# Patient Record
Sex: FEMALE | Race: OTHER | HISPANIC OR LATINO | URBAN - METROPOLITAN AREA
[De-identification: names, ages, dates, MRNs, and addresses within clinical notes are randomized per-mention and may not be internally consistent; named-entity substitution may affect disease eponyms.]

---

## 2019-07-22 ENCOUNTER — EMERGENCY (EMERGENCY)
Facility: HOSPITAL | Age: 79
LOS: 1 days | Discharge: HOME | End: 2019-07-22
Attending: EMERGENCY MEDICINE
Payer: COMMERCIAL

## 2019-07-22 VITALS
OXYGEN SATURATION: 100 % | DIASTOLIC BLOOD PRESSURE: 63 MMHG | HEART RATE: 60 BPM | SYSTOLIC BLOOD PRESSURE: 112 MMHG | RESPIRATION RATE: 18 BRPM | TEMPERATURE: 98 F

## 2019-07-22 VITALS
WEIGHT: 167.99 LBS | TEMPERATURE: 98 F | OXYGEN SATURATION: 99 % | HEIGHT: 62 IN | SYSTOLIC BLOOD PRESSURE: 120 MMHG | HEART RATE: 92 BPM | RESPIRATION RATE: 20 BRPM | DIASTOLIC BLOOD PRESSURE: 71 MMHG

## 2019-07-22 PROCEDURE — 99283 EMERGENCY DEPT VISIT LOW MDM: CPT

## 2019-07-22 PROCEDURE — 72220 X-RAY EXAM SACRUM TAILBONE: CPT | Mod: 26

## 2019-07-22 PROCEDURE — 99283 EMERGENCY DEPT VISIT LOW MDM: CPT | Mod: 25

## 2019-07-22 PROCEDURE — 72220 X-RAY EXAM SACRUM TAILBONE: CPT

## 2019-07-22 NOTE — ED PROVIDER NOTE - CLINICAL SUMMARY MEDICAL DECISION MAKING FREE TEXT BOX
80 y/o F pt presents with mechanical fall with coccyx pain. Will obtain x-ray to evaluate for coccyx fracture and reassess.

## 2019-07-22 NOTE — ED PROVIDER NOTE - OBJECTIVE STATEMENT
78 y/o F pt with a PMHx of DM, HTN, HLD, presents to the ED with complaints of tailbone pain so fall 2 weeks ago. Patient reports she lost her balance and fell on her bottom. Patient is c/o or pain when sitting and has not taken any medication for the pain. Patient denies head trauma, syncope, LOC or any other symptoms. NKDA.

## 2019-07-22 NOTE — ED ADULT TRIAGE NOTE - CHIEF COMPLAINT QUOTE
Pain to coccyx area s/p trip and fall x 1 week ago. Has fracture right wrist had surgery x 2 weeks ago.

## 2021-03-20 ENCOUNTER — INPATIENT (INPATIENT)
Facility: HOSPITAL | Age: 81
LOS: 5 days | Discharge: HOME | DRG: 871 | End: 2021-03-26
Attending: HOSPITALIST | Admitting: HOSPITALIST
Payer: COMMERCIAL

## 2021-03-20 VITALS
OXYGEN SATURATION: 95 % | HEIGHT: 62 IN | RESPIRATION RATE: 18 BRPM | HEART RATE: 100 BPM | SYSTOLIC BLOOD PRESSURE: 154 MMHG | DIASTOLIC BLOOD PRESSURE: 85 MMHG | WEIGHT: 164.91 LBS | TEMPERATURE: 102 F

## 2021-03-20 DIAGNOSIS — A41.9 SEPSIS, UNSPECIFIED ORGANISM: ICD-10-CM

## 2021-03-20 LAB
ALBUMIN SERPL ELPH-MCNC: 3.3 G/DL — LOW (ref 3.5–5)
ALP SERPL-CCNC: 141 U/L — HIGH (ref 40–120)
ALT FLD-CCNC: 47 U/L DA — SIGNIFICANT CHANGE UP (ref 10–60)
ANION GAP SERPL CALC-SCNC: 10 MMOL/L — SIGNIFICANT CHANGE UP (ref 5–17)
APPEARANCE UR: ABNORMAL
APTT BLD: 30.3 SEC — SIGNIFICANT CHANGE UP (ref 27.5–35.5)
AST SERPL-CCNC: 91 U/L — HIGH (ref 10–40)
BASOPHILS # BLD AUTO: 0.01 K/UL — SIGNIFICANT CHANGE UP (ref 0–0.2)
BASOPHILS NFR BLD AUTO: 0.3 % — SIGNIFICANT CHANGE UP (ref 0–2)
BILIRUB SERPL-MCNC: 1 MG/DL — SIGNIFICANT CHANGE UP (ref 0.2–1.2)
BILIRUB UR-MCNC: NEGATIVE — SIGNIFICANT CHANGE UP
BUN SERPL-MCNC: 19 MG/DL — HIGH (ref 7–18)
CALCIUM SERPL-MCNC: 8.8 MG/DL — SIGNIFICANT CHANGE UP (ref 8.4–10.5)
CHLORIDE SERPL-SCNC: 102 MMOL/L — SIGNIFICANT CHANGE UP (ref 96–108)
CK SERPL-CCNC: 783 U/L — HIGH (ref 21–215)
CO2 SERPL-SCNC: 24 MMOL/L — SIGNIFICANT CHANGE UP (ref 22–31)
COLOR SPEC: YELLOW — SIGNIFICANT CHANGE UP
CREAT SERPL-MCNC: 1.1 MG/DL — SIGNIFICANT CHANGE UP (ref 0.5–1.3)
D DIMER BLD IA.RAPID-MCNC: 211 NG/ML DDU — SIGNIFICANT CHANGE UP
DIFF PNL FLD: ABNORMAL
EOSINOPHIL # BLD AUTO: 0 K/UL — SIGNIFICANT CHANGE UP (ref 0–0.5)
EOSINOPHIL NFR BLD AUTO: 0 % — SIGNIFICANT CHANGE UP (ref 0–6)
GLUCOSE SERPL-MCNC: 333 MG/DL — HIGH (ref 70–99)
GLUCOSE UR QL: 1000 MG/DL
HCT VFR BLD CALC: 36.4 % — SIGNIFICANT CHANGE UP (ref 34.5–45)
HGB BLD-MCNC: 12.2 G/DL — SIGNIFICANT CHANGE UP (ref 11.5–15.5)
IMM GRANULOCYTES NFR BLD AUTO: 0.6 % — SIGNIFICANT CHANGE UP (ref 0–1.5)
INR BLD: 1.28 RATIO — HIGH (ref 0.88–1.16)
KETONES UR-MCNC: ABNORMAL
LACTATE SERPL-SCNC: 2.4 MMOL/L — HIGH (ref 0.7–2)
LEUKOCYTE ESTERASE UR-ACNC: ABNORMAL
LYMPHOCYTES # BLD AUTO: 0.77 K/UL — LOW (ref 1–3.3)
LYMPHOCYTES # BLD AUTO: 21.6 % — SIGNIFICANT CHANGE UP (ref 13–44)
MCHC RBC-ENTMCNC: 29.5 PG — SIGNIFICANT CHANGE UP (ref 27–34)
MCHC RBC-ENTMCNC: 33.5 GM/DL — SIGNIFICANT CHANGE UP (ref 32–36)
MCV RBC AUTO: 87.9 FL — SIGNIFICANT CHANGE UP (ref 80–100)
MONOCYTES # BLD AUTO: 0.43 K/UL — SIGNIFICANT CHANGE UP (ref 0–0.9)
MONOCYTES NFR BLD AUTO: 12.1 % — SIGNIFICANT CHANGE UP (ref 2–14)
NEUTROPHILS # BLD AUTO: 2.33 K/UL — SIGNIFICANT CHANGE UP (ref 1.8–7.4)
NEUTROPHILS NFR BLD AUTO: 65.4 % — SIGNIFICANT CHANGE UP (ref 43–77)
NITRITE UR-MCNC: NEGATIVE — SIGNIFICANT CHANGE UP
NRBC # BLD: 0 /100 WBCS — SIGNIFICANT CHANGE UP (ref 0–0)
PH UR: 5 — SIGNIFICANT CHANGE UP (ref 5–8)
PLATELET # BLD AUTO: 155 K/UL — SIGNIFICANT CHANGE UP (ref 150–400)
POTASSIUM SERPL-MCNC: 3.9 MMOL/L — SIGNIFICANT CHANGE UP (ref 3.5–5.3)
POTASSIUM SERPL-SCNC: 3.9 MMOL/L — SIGNIFICANT CHANGE UP (ref 3.5–5.3)
PROT SERPL-MCNC: 7.3 G/DL — SIGNIFICANT CHANGE UP (ref 6–8.3)
PROT UR-MCNC: 100
PROTHROM AB SERPL-ACNC: 15 SEC — HIGH (ref 10.6–13.6)
RAPID RVP RESULT: DETECTED
RBC # BLD: 4.14 M/UL — SIGNIFICANT CHANGE UP (ref 3.8–5.2)
RBC # FLD: 12.3 % — SIGNIFICANT CHANGE UP (ref 10.3–14.5)
SARS-COV-2 RNA SPEC QL NAA+PROBE: DETECTED
SODIUM SERPL-SCNC: 136 MMOL/L — SIGNIFICANT CHANGE UP (ref 135–145)
SP GR SPEC: 1.02 — SIGNIFICANT CHANGE UP (ref 1.01–1.02)
TROPONIN I SERPL-MCNC: 0.06 NG/ML — HIGH (ref 0–0.04)
UROBILINOGEN FLD QL: 1
WBC # BLD: 3.56 K/UL — LOW (ref 3.8–10.5)
WBC # FLD AUTO: 3.56 K/UL — LOW (ref 3.8–10.5)

## 2021-03-20 PROCEDURE — 72131 CT LUMBAR SPINE W/O DYE: CPT | Mod: 26,MA

## 2021-03-20 PROCEDURE — 93010 ELECTROCARDIOGRAM REPORT: CPT

## 2021-03-20 PROCEDURE — 72125 CT NECK SPINE W/O DYE: CPT | Mod: 26,MA

## 2021-03-20 PROCEDURE — 99222 1ST HOSP IP/OBS MODERATE 55: CPT

## 2021-03-20 PROCEDURE — 70450 CT HEAD/BRAIN W/O DYE: CPT | Mod: 26,MA

## 2021-03-20 PROCEDURE — 99285 EMERGENCY DEPT VISIT HI MDM: CPT

## 2021-03-20 PROCEDURE — 73610 X-RAY EXAM OF ANKLE: CPT | Mod: 26,LT

## 2021-03-20 PROCEDURE — 72192 CT PELVIS W/O DYE: CPT | Mod: 26,MA

## 2021-03-20 PROCEDURE — 71045 X-RAY EXAM CHEST 1 VIEW: CPT | Mod: 26

## 2021-03-20 PROCEDURE — 73562 X-RAY EXAM OF KNEE 3: CPT | Mod: 26,LT

## 2021-03-20 RX ORDER — ENOXAPARIN SODIUM 100 MG/ML
40 INJECTION SUBCUTANEOUS DAILY
Refills: 0 | Status: DISCONTINUED | OUTPATIENT
Start: 2021-03-20 | End: 2021-03-23

## 2021-03-20 RX ORDER — ACETAMINOPHEN 500 MG
650 TABLET ORAL ONCE
Refills: 0 | Status: COMPLETED | OUTPATIENT
Start: 2021-03-20 | End: 2021-03-20

## 2021-03-20 RX ORDER — PIPERACILLIN AND TAZOBACTAM 4; .5 G/20ML; G/20ML
3.38 INJECTION, POWDER, LYOPHILIZED, FOR SOLUTION INTRAVENOUS ONCE
Refills: 0 | Status: COMPLETED | OUTPATIENT
Start: 2021-03-20 | End: 2021-03-20

## 2021-03-20 RX ORDER — SODIUM CHLORIDE 9 MG/ML
1550 INJECTION INTRAMUSCULAR; INTRAVENOUS; SUBCUTANEOUS ONCE
Refills: 0 | Status: COMPLETED | OUTPATIENT
Start: 2021-03-20 | End: 2021-03-20

## 2021-03-20 RX ORDER — ASPIRIN/CALCIUM CARB/MAGNESIUM 324 MG
162 TABLET ORAL ONCE
Refills: 0 | Status: COMPLETED | OUTPATIENT
Start: 2021-03-20 | End: 2021-03-20

## 2021-03-20 RX ADMIN — Medication 650 MILLIGRAM(S): at 21:18

## 2021-03-20 RX ADMIN — Medication 162 MILLIGRAM(S): at 22:12

## 2021-03-20 RX ADMIN — SODIUM CHLORIDE 1550 MILLILITER(S): 9 INJECTION INTRAMUSCULAR; INTRAVENOUS; SUBCUTANEOUS at 21:18

## 2021-03-20 RX ADMIN — Medication 650 MILLIGRAM(S): at 21:57

## 2021-03-20 RX ADMIN — PIPERACILLIN AND TAZOBACTAM 200 GRAM(S): 4; .5 INJECTION, POWDER, LYOPHILIZED, FOR SOLUTION INTRAVENOUS at 21:18

## 2021-03-20 RX ADMIN — SODIUM CHLORIDE 1550 MILLILITER(S): 9 INJECTION INTRAMUSCULAR; INTRAVENOUS; SUBCUTANEOUS at 22:40

## 2021-03-20 NOTE — H&P ADULT - PROBLEM SELECTOR PLAN 7
fu lipid panel and continue with home medications Pt at home on lisinopril 40 qd and metoprolol succ 50 qd   will cw metoprolol tart 25 bid for now

## 2021-03-20 NOTE — H&P ADULT - NSHPLABSRESULTS_GEN_ALL_CORE
< from: CT Lumbar Spine No Cont (03.20.21 @ 21:16) >      EXAM:  CT LUMBAR SPINE                          EXAM:  CT PELVIS BONY ONLY                            PROCEDURE DATE:  03/20/2021          INTERPRETATION:  CLINICAL INFORMATION: Status post fall with left hip and lower back pain.    TECHNIQUE: Axial noncontrast CT imaging of the bony pelvis and lumbar spine was performed. Coronal cell reformats were obtained.    COMPARISON: 11/6/2012 abdominal CT    FINDINGS:    Pelvis: No acute displaced fracture or dislocation is identified. There is questionable chronic fracture deformity of the sacrum (8, 142). The pubic symphysis and sacroiliac joints are maintained. There is mild bilateral hip osteoarthrosis.    Lumbar spine: There are 5 nonrib-bearing lumbar-type vertebral bodies. Lumbar lordosis is maintained. There is no spondylolisthesis. Vertebral body heights are maintained. There is mild degenerative disc disease at L5-S1. No high-grade stenosis is identified.    Intra-abdominal findings: Colonic diverticulosis. Atherosclerotic vascular calcifications. Cholecystectomy. 1.4 cm right adnexal cyst.    IMPRESSION:      1. No acute displaced fracture. MRI can be obtained for further evaluation if there is a high level clinical concern.  2. Incidentally noted 1.4 cm right adnexal cyst. Follow-up nonemergent pelvic ultrasound is recommended.            HERNAN ARIZA MD; Attending Radiologist  This document has been electronically signed. Mar 20 2021  9:37PM    < end of copied text >    < from: CT Cervical Spine No Cont (03.20.21 @ 21:08) >    CERVICAL SPINE:  There is no acute displaced fracture or traumatic malalignment.  There is no prevertebral soft tissue swelling.    There is maintenance of the cervical lordosis.  Vertebral heights and intervertebral disc spaces are grossly maintained. C3 vertebral hemangioma.    IMPRESSION:    BRAIN: No intracranial hemorrhage, mass effect or depressed skull fracture.  CERVICAL SPINE: No displaced fracture or traumatic malalignment.      < end of copied text > < from: CT Lumbar Spine No Cont (03.20.21 @ 21:16) >    Pelvis: No acute displaced fracture or dislocation is identified. There is questionable chronic fracture deformity of the sacrum (8, 142). The pubic symphysis and sacroiliac joints are maintained. There is mild bilateral hip osteoarthrosis.    Lumbar spine: There are 5 nonrib-bearing lumbar-type vertebral bodies. Lumbar lordosis is maintained. There is no spondylolisthesis. Vertebral body heights are maintained. There is mild degenerative disc disease at L5-S1. No high-grade stenosis is identified.    Intra-abdominal findings: Colonic diverticulosis. Atherosclerotic vascular calcifications. Cholecystectomy. 1.4 cm right adnexal cyst.    IMPRESSION:      1. No acute displaced fracture. MRI can be obtained for further evaluation if there is a high level clinical concern.  2. Incidentally noted 1.4 cm right adnexal cyst. Follow-up nonemergent pelvic ultrasound is recommended.          < end of copied text >        < from: CT Cervical Spine No Cont (03.20.21 @ 21:08) >    CERVICAL SPINE:  BRAIN: No intracranial hemorrhage, mass effect or depressed skull fracture.  CERVICAL SPINE: No displaced fracture or traumatic malalignment.    IMPRESSION:    BRAIN: No intracranial hemorrhage, mass effect or depressed skull fracture.  CERVICAL SPINE: No displaced fracture or traumatic malalignment.      < end of copied text >

## 2021-03-20 NOTE — H&P ADULT - PROBLEM SELECTOR PLAN 5
pt is on room air not requiring any supplemental o2   will continue to monitor pt presents with multiple falls   CT BRAIN: No intracranial hemorrhage, mass effect or depressed skull fracture.  CERVICAL SPINE: No displaced fracture or traumatic malalignment.  Lumbar spine: No acute displaced fracture.  will consult Physical therapy

## 2021-03-20 NOTE — H&P ADULT - PROBLEM SELECTOR PLAN 8
pt presents with multipe falls   CT BRAIN: No intracranial hemorrhage, mass effect or depressed skull fracture.  CERVICAL SPINE: No displaced fracture or traumatic malalignment.  Lumbar spine: No acute displaced fracture.  will consult Physical therapy fu lipid panel and continue with home medications

## 2021-03-20 NOTE — ED PROVIDER NOTE - CADM POA PRESS ULCER
Emergency Department Resident Note    Patient: Sakina Navarro Age: 36 year old Sex: female   MRN: [id number] : 1985 Encounter Date: 2021       HISTORY OF PRESENT ILLNESS  Sakina Navarro is a 36 year old female with past medical history of hypothyroidism who presents to the emergency department for evaluation of persistent abdominal pain, nausea, vomiting, diarrhea x4 days.  Patient reports she ate homemade steak tacos 4 days ago.  Initially felt fine immediately after eating.  Was able to complete her nighttime chores.  Woke up at 11:30 PM that evening with abdominal pain and vomiting diarrhea.  Patient reports several episodes of nonbilious nonbloody emesis and nonbloody diarrhea.  States everything she is trying to eat has immediately come back up.  Unable to tolerate p.o. intake.  States both her children had similar symptoms and were admitted to the pediatric hospital discharge today.  Diagnosis of gastroenteritis.  They were both negative for Covid at time of admission.  Associated symptoms include chills, denies any fevers but does not have a thermometer at home.  No history of similar symptoms.  Denies fever, chills, cough, congestion, chest pain, shortness of breath, dysuria, headache, or weakness/numbness. No other complaints.     EM Caveat: None  PPE: gloves, mask, faceshield  Social Hx: Denies tobacco use, daily alcohol, daily marijuana use  Surgical Hx: No surgical      REVIEW OF SYSTEMS  Review of Systems   Constitutional: Negative for fever.   HENT: Negative for sore throat.    Eyes: Negative for redness.   Respiratory: Negative for cough and shortness of breath.    Cardiovascular: Negative for chest pain and leg swelling.   Gastrointestinal: Positive for abdominal pain, diarrhea, nausea and vomiting.   Genitourinary: Negative for dysuria and hematuria.   Musculoskeletal: Negative for back pain.   Skin: Negative for rash.   Neurological: Negative for headaches.              PAST  HISTORY         Past Medical History:   Diagnosis Date   • Anxiety    • Depression    • Thyroid condition     No past surgical history on file.          Social History     Tobacco Use   • Smoking status: Not on file   Substance Use Topics   • Alcohol use: Not on file   • Drug use: Not on file    No family history on file.          Prior to Admission medications    Medication Sig Start Date End Date Taking? Authorizing Provider   ondansetron (Zofran ODT) 4 MG disintegrating tablet Place 1 tablet onto the tongue every 6 hours. 3/5/21   Fatoumata BRITTANY Diaz    Not on File     PHYSICAL EXAMINATION  ED Triage Vitals   ED Triage Vitals Group      Temp 03/05/21 1907 98.6 °F (37 °C)      Heart Rate 03/05/21 1907 76      Resp 03/05/21 1907 16      BP 03/05/21 1907 135/86      SpO2 03/05/21 1907 97 %      EtCO2 mmHg --       Height --       Weight 03/05/21 1904 216 lb 7.9 oz (98.2 kg)      Weight Scale Used 03/05/21 1907 Standing scale      BMI (Calculated) --       IBW/kg (Calculated) --      Physical Exam  Vitals signs reviewed.   HENT:      Head: Normocephalic and atraumatic.      Mouth/Throat:      Mouth: Mucous membranes are moist.   Eyes:      Extraocular Movements: Extraocular movements intact.      Pupils: Pupils are equal, round, and reactive to light.   Cardiovascular:      Rate and Rhythm: Normal rate and regular rhythm.      Pulses: Normal pulses.      Heart sounds: Normal heart sounds.   Pulmonary:      Effort: Pulmonary effort is normal.      Breath sounds: Normal breath sounds.   Abdominal:      General: There is no distension.      Palpations: Abdomen is soft.      Tenderness: There is abdominal tenderness in the epigastric area. There is no guarding or rebound.   Skin:     General: Skin is warm and dry.      Capillary Refill: Capillary refill takes less than 2 seconds.   Neurological:      General: No focal deficit present.      Mental Status: She is alert and oriented to person, place, and time.    Psychiatric:         Mood and Affect: Mood normal.         Behavior: Behavior normal.         MEDICAL DECISION MAKING  36-year-old female with past medical history of hypothyroidism, who presents the emerge department for evaluation of persistent abdominal pain, nausea, vomiting, diarrhea x4 days.  On initial arrival patient is afebrile stable vital signs.  Physical exam significant for mild epigastric tenderness, without rebound, without guarding, no peritoneal signs.  Plan for symptoms consistent with viral gastroenteritis.  Especially given the fact that patient's children had similar symptoms.  Plan for basic labs including CBC, CMP, lipase, urinalysis.  Will treat due to medical fluids and Zofran.  We will also test for Covid.  Will observe patient in the emergency department ensure she is able to tolerate p.o. prior to discharge.      Labs:   Results for orders placed or performed during the hospital encounter of 03/05/21   Comprehensive Metabolic Panel   Result Value Ref Range    Fasting Status      Sodium 138 135 - 145 mmol/L    Potassium 3.5 3.4 - 5.1 mmol/L    Chloride 108 (H) 98 - 107 mmol/L    Carbon Dioxide 27 21 - 32 mmol/L    Anion Gap 7 (L) 10 - 20 mmol/L    Glucose 91 65 - 99 mg/dL    BUN 19 6 - 20 mg/dL    Creatinine 0.71 0.51 - 0.95 mg/dL    Glomerular Filtration Rate >90 >90 mL/min/1.73m2    BUN/ Creatinine Ratio 27 (H) 7 - 25    Calcium 8.6 8.4 - 10.2 mg/dL    Bilirubin, Total 0.6 0.2 - 1.0 mg/dL    GOT/AST 27 <=37 Units/L    GPT/ALT 47 <64 Units/L    Alkaline Phosphatase 63 45 - 117 Units/L    Albumin 4.1 3.6 - 5.1 g/dL    Protein, Total 8.6 (H) 6.4 - 8.2 g/dL    Globulin 4.5 (H) 2.0 - 4.0 g/dL    A/G Ratio 0.9 (L) 1.0 - 2.4   Lipase   Result Value Ref Range    Lipase 391 73 - 393 Units/L   CBC with Automated Differential (performable only)   Result Value Ref Range    WBC 5.5 4.2 - 11.0 K/mcL    RBC 3.78 (L) 4.00 - 5.20 mil/mcL    HGB 12.3 12.0 - 15.5 g/dL    HCT 35.6 (L) 36.0 - 46.5 %    MCV  94.2 78.0 - 100.0 fl    MCH 32.5 26.0 - 34.0 pg    MCHC 34.6 32.0 - 36.5 g/dL    RDW-CV 13.0 11.0 - 15.0 %    RDW-SD 44.4 39.0 - 50.0 fL     140 - 450 K/mcL    NRBC 0 <=0 /100 WBC    Neutrophil, Percent 53 %    Lymphocytes, Percent 36 %    Mono, Percent 10 %    Eosinophils, Percent 1 %    Basophils, Percent 0 %    Immature Granulocytes 0 %    Absolute Neutrophils 2.8 1.8 - 7.7 K/mcL    Absolute Lymphocytes 2.0 1.0 - 4.8 K/mcL    Absolute Monocytes 0.6 0.3 - 0.9 K/mcL    Absolute Eosinophils  0.0 0.0 - 0.5 K/mcL    Absolute Basophils 0.0 0.0 - 0.3 K/mcL    Absolute Immmature Granulocytes 0.0 0.0 - 0.2 K/mcL   HCG POC   Result Value Ref Range    HCG, URINE - POINT OF CARE Negative Negative       ED Course as of Mar 05 2346   Fri Mar 05, 2021   2346 Patient able to tolerate p.o. intake here in the emergency department.  Will discharge home with prescription for Zofran.  Also will send outpatient Covid test given symptoms.  Return precaution discussed with patient stable nontoxic-appearing time of discharge.    [OO]      ED Course User Index  [OO] Fatoumata Higginbotham     ED Medication Orders (From admission, onward)    Ordered Start     Status Ordering Provider    03/05/21 2109 03/05/21 2110  lactated ringers bolus 1,000 mL  ONCE      Last MAR action: Completed FATOUMATA HIGGINBOTHAM    03/05/21 2109 03/05/21 2110  ondansetron (ZOFRAN) injection 4 mg  ONCE      Last MAR action: Given FATOUMATA HIGGINBOTHAM            IMPRESSION AND PLAN  ED Diagnosis   1. Gastroenteritis         Discharge 3/5/2021 11:00 PM  Sakina Navarro discharge to home/self care.           X Patient discharged with prescriptions for the following. Discussed medication in detail including dosing, side effects, and interactions. :     Summary of your Discharge Medications      Take these Medications      Details   ondansetron 4 MG disintegrating tablet  Commonly known as: Zofran ODT   Place 1 tablet onto the tongue every 6 hours.            This patient was seen during the novel Coronavirus, COVID-19 pandemic. There were significant changes in work flow, staffing, resource availability, disposition and practice patterns due to this pandemic (as recommended by the CDC and [employer]). This note may have been created using voice dictation technology and may include inadvertent errors.     Fatoumata Diaz  Emergency Medicine, PGY-3         Fatoumata Diaz  Resident  03/05/21 9809     No

## 2021-03-20 NOTE — H&P ADULT - PROBLEM SELECTOR PROBLEM 1
Sepsis, due to unspecified organism, unspecified whether acute organ dysfunction present Sepsis Urinary tract infection with hematuria, site unspecified

## 2021-03-20 NOTE — H&P ADULT - PROBLEM SELECTOR PLAN 9
IMPROVE VTE Individual Risk Assessment  RISK                                                         Points  [  ] Previous VTE                                      3  [  ] Thrombophilia                                   2  [  ] Lower limb paralysis                         2 (unable to hold up >15 seconds)    [  ] Current Cancer                                  2       (within 6 months)  [  ] Immobilization > 24 hrs                    1  [  ] ICU/CCU stay > 24 hrs                         1  [  ] Age > 60                                              1   lovenox qd for dvt ppx

## 2021-03-20 NOTE — H&P ADULT - PROBLEM SELECTOR PLAN 6
Pt at home on lisinopril 40 qd and metoprolol succ 50 qd   will cw metoprolol tart 25 bid for now uncontrolled DM   pt at home on metformin 500 qd   fu a1c   urine glu >100, bg >300   started on lantus 10 u and hss

## 2021-03-20 NOTE — H&P ADULT - ATTENDING COMMENTS
Vital Signs Last 24 Hrs  T(C): 37.7 (20 Mar 2021 23:33), Max: 38.9 (20 Mar 2021 19:45)  T(F): 99.9 (20 Mar 2021 23:33), Max: 102 (20 Mar 2021 19:45)  HR: 81 (20 Mar 2021 23:33) (81 - 100)  BP: 123/71 (20 Mar 2021 23:33) (123/71 - 154/85)  BP(mean): --  RR: 17 (20 Mar 2021 23:33) (17 - 18)  SpO2: 97% (20 Mar 2021 23:33) (95% - 97%) Pt seen and examined  Case discussed with MAR.    80 year old woman with PMH of HTN, HLD, DM2,  BIBEMS after a few days of flu-like illness, productive cough and generalized weakness. On the day of admission, she states her leg gave way and she sustained a fall. No LOC, no head trauma.    Vital Signs Last 24 Hrs  T(C): 37.7 (20 Mar 2021 23:33), Max: 38.9 (20 Mar 2021 19:45)  T(F): 99.9 (20 Mar 2021 23:33), Max: 102 (20 Mar 2021 19:45)  HR: 81 (20 Mar 2021 23:33) (81 - 100)  BP: 123/71 (20 Mar 2021 23:33) (123/71 - 154/85)  RR: 17 (20 Mar 2021 23:33) (17 - 18)  SpO2: 97% (20 Mar 2021 23:33) (95% - 97%)    Elderly woman, states she feels better, NAD AAO X 3  CTA B/L RRR S1S2]  SaO2 - 96% on RA  Soft NT ND BS +  No pedal edema    Labs                        12.2   3.56  )-----------( 155      ( 20 Mar 2021 20:53 )             36.4     03-20    136  |  102  |  19<H>  ----------------------------<  333<H>  3.9   |  24  |  1.10    Ca    8.8      20 Mar 2021 20:53    TPro  7.3  /  Alb  3.3<L>  /  TBili  1.0  /  DBili  x   /  AST  91<H>  /  ALT  47  /  AlkPhos  141<H>  03-20    lact -2.4    CARDIAC MARKERS ( 21 Mar 2021 01:04 )  0.078 ng/mL / x     / x     / x     / x      CARDIAC MARKERS ( 20 Mar 2021 20:53 )  0.061 ng/mL / x     / 783 U/L / x     / x        Urinalysis Basic - ( 20 Mar 2021 21:53 )    Color: Yellow / Appearance: Slightly Turbid / S.020 / pH: x  Gluc: x / Ketone: Large  / Bili: Negative / Urobili: 1   Blood: x / Protein: 100 / Nitrite: Negative   Leuk Esterase: Moderate / RBC: 10-25 /HPF / WBC 26-50 /HPF   Sq Epi: x / Non Sq Epi: Many /HPF / Bacteria: Many /HPF    SARS-CoV-2: Detected (20 Mar 2021 21:25)    CT L-spine - no acute fracture  CT -c-spine - no acute fracture  head CT - no acute issues  CT pelvis - 1.4cm adnexal mass    Impression  - sepsis  - Acute UTI   - Acute covid 19 neumonia Pt seen and examined  Case discussed with MAR.    80 year old woman with PMH of HTN, HLD, DM2,  BIBEMS after a few days of flu-like illness, productive cough and generalized weakness. On the day of admission, she states her leg gave way and she sustained a fall. No LOC, no head trauma.    Vital Signs Last 24 Hrs  T(C): 37.7 (20 Mar 2021 23:33), Max: 38.9 (20 Mar 2021 19:45)  T(F): 99.9 (20 Mar 2021 23:33), Max: 102 (20 Mar 2021 19:45)  HR: 81 (20 Mar 2021 23:33) (81 - 100)  BP: 123/71 (20 Mar 2021 23:33) (123/71 - 154/85)  RR: 17 (20 Mar 2021 23:33) (17 - 18)  SpO2: 97% (20 Mar 2021 23:33) (95% - 97%)    Elderly woman, states she feels better, NAD AAO X 3  CTA B/L RRR S1S2]  SaO2 - 96% on RA  Soft NT ND BS +  No pedal edema  No focal deficits    Labs                        12.2   3.56  )-----------( 155      ( 20 Mar 2021 20:53 )             36.4     03-20    136  |  102  |  19<H>  ----------------------------<  333<H>  3.9   |  24  |  1.10    Ca    8.8      20 Mar 2021 20:53    TPro  7.3  /  Alb  3.3<L>  /  TBili  1.0  /  DBili  x   /  AST  91<H>  /  ALT  47  /  AlkPhos  141<H>  03-20    lact -2.4    CARDIAC MARKERS ( 21 Mar 2021 01:04 )  0.078 ng/mL / x     / x     / x     / x      CARDIAC MARKERS ( 20 Mar 2021 20:53 )  0.061 ng/mL / x     / 783 U/L / x     / x        Urinalysis Basic - ( 20 Mar 2021 21:53 )    Color: Yellow / Appearance: Slightly Turbid / S.020 / pH: x  Gluc: x / Ketone: Large  / Bili: Negative / Urobili: 1   Blood: x / Protein: 100 / Nitrite: Negative   Leuk Esterase: Moderate / RBC: 10-25 /HPF / WBC 26-50 /HPF   Sq Epi: x / Non Sq Epi: Many /HPF / Bacteria: Many /HPF    SARS-CoV-2: Detected (20 Mar 2021 21:25)    CT L-spine - no acute fracture  CT -c-spine - no acute fracture  head CT - no acute issues  CT pelvis - 1.4cm adnexal mass    Impression  - Acute UTI   - Acute Covid 19 pneumonia  - Sepsis with lactic acidosis  - Elevated troponin; NSTEMI vs demand ischemia  - Fall   - Incidental adnexal mass    Plan   - Admit to telemetry with airborne precaution  - Sepsis work up with blood and urine culture  - Initiate empiric antibiotics with IV ceftriaxone for UTI   - No indication for IV dexamethasone or remdesivir   - Serial troponin; EKG   - ECHO  - If troponin uptrending significantly, NSTEMI protocol  - PT evaluation; no evidence of fracture  - IVF hydration  - Non-urgent pelvic U/S

## 2021-03-20 NOTE — ED PROVIDER NOTE - MUSCULOSKELETAL, MLM
Range of motion is not limited, no muscle or joint tenderness; Mild tenderness to posterior C-Spine and midline lumbar spine; Extremities: full ROM, no swelling, neurovascularly intact.

## 2021-03-20 NOTE — H&P ADULT - PROBLEM SELECTOR PLAN 3
Pt noted to have elevated troponin .061  likely T2 in the setting of sepsis   denies chest pain   no st elevation on ekg   will send T2   will monitor on tele Pt presented with falls, weakness and malaise   ED vitals fever 102 , wbc 3.5 low   UA positive, + suprapubic tenderness  s/p rocephin in E D  lactate 2.4 s/p ivf bolus in ED   COVID positive   fu urine cultures   will cont with rocephin until cx are back with sensitivity Pt presented with falls, weakness and malaise   ED vitals fever 102 , wbc 3.5 low   UA positive, + suprapubic tenderness  s/p rocephin in E D  lactate 2.4 s/p ivf bolus in ED   plan as above

## 2021-03-20 NOTE — ED ADULT NURSE NOTE - NSIMPLEMENTINTERV_GEN_ALL_ED
Implemented All Fall Risk Interventions:  Wake to call system. Call bell, personal items and telephone within reach. Instruct patient to call for assistance. Room bathroom lighting operational. Non-slip footwear when patient is off stretcher. Physically safe environment: no spills, clutter or unnecessary equipment. Stretcher in lowest position, wheels locked, appropriate side rails in place. Provide visual cue, wrist band, yellow gown, etc. Monitor gait and stability. Monitor for mental status changes and reorient to person, place, and time. Review medications for side effects contributing to fall risk. Reinforce activity limits and safety measures with patient and family.

## 2021-03-20 NOTE — H&P ADULT - NSHPPHYSICALEXAM_GEN_ALL_CORE
Vital Signs Last 24 Hrs  T(C): 37.7 (20 Mar 2021 23:33), Max: 38.9 (20 Mar 2021 19:45)  T(F): 99.9 (20 Mar 2021 23:33), Max: 102 (20 Mar 2021 19:45)  HR: 81 (20 Mar 2021 23:33) (78 - 100)  BP: 123/71 (20 Mar 2021 23:33) (123/71 - 154/85)  BP(mean): --  RR: 17 (20 Mar 2021 23:33) (17 - 18)  SpO2: 97% (20 Mar 2021 23:33) (95% - 97%)    GENERAL: NAD, lying in bed comfortably  HEAD:  Atraumatic, Normocephalic  EYES: EOMI, PERRLA, conjunctiva and sclera clear  ENT: Moist mucous membranes  NECK: Supple, No JVD  CHEST/LUNG: Clear to auscultation bilaterally; No rales, rhonchi, wheezing, or rubs.  HEART: Regular rate and rhythm; S1+ S2+  ABDOMEN: Bowel sounds present; Soft, Nontender, Nondistended. No hepatomegaly  EXTREMITIES:  2+ Peripheral Pulses, brisk capillary refill. No clubbing, cyanosis, or edema  NERVOUS SYSTEM:  Alert & Oriented , speech clear. No deficits   MSK: FROM all 4 extremities, full and equal strength  SKIN: No rashes or lesions Vital Signs Last 24 Hrs  T(C): 37.7 (20 Mar 2021 23:33), Max: 38.9 (20 Mar 2021 19:45)  T(F): 99.9 (20 Mar 2021 23:33), Max: 102 (20 Mar 2021 19:45)  HR: 81 (20 Mar 2021 23:33) (78 - 100)  BP: 123/71 (20 Mar 2021 23:33) (123/71 - 154/85)  BP(mean): --  RR: 17 (20 Mar 2021 23:33) (17 - 18)  SpO2: 97% (20 Mar 2021 23:33) (95% - 97%)    GENERAL: NAD, lying in bed comfortably  HEAD:  Atraumatic, Normocephalic  EYES: EOMI, PERRLA, conjunctiva and sclera clear  ENT: Moist mucous membranes  NECK: Supple, No JVD  CHEST/LUNG: Clear to auscultation bilaterally; No rales, rhonchi, wheezing, or rubs.  HEART: Regular rate and rhythm; S1+ S2+  ABDOMEN: Bowel sounds present; Soft,  + suprapubic tenderness , Nondistended.   EXTREMITIES:  2+ Peripheral Pulses, brisk capillary refill. No clubbing, cyanosis, or edema  NERVOUS SYSTEM:  Alert & Oriented , speech clear. No deficits   MSK: FROM all 4 extremities, full and equal strength  SKIN: No rashes or lesions

## 2021-03-20 NOTE — H&P ADULT - PROBLEM SELECTOR PLAN 1
Pt presented with falls, weakness and malaise   ED vitals fever 102 , wbc 3.5 low   UA positive, + suprapubic tenderness  s/p rocephin in E D  lactate 2.4 s/p ivf bolus in ED   COVID positive   fu urine cultures   will cont with rocephin until cx are back with sensitivity plan as above

## 2021-03-20 NOTE — ED PROVIDER NOTE - OBJECTIVE STATEMENT
81 y/o female with history of HTN, high cholesterol, and DM, was brought in by ambulance from home with generalized weakness and malaise for 3 days as per family. Patient reports she fell (mechanical fall) at 8:00 AM this morning but could get up from floor until this evening. Patient notes mild pain to lower back and left leg, poorly localized. Patient noted that when she tried to get up from floor she had minor head injury and mild chest discomfort today. She admits subjective fever and cough. Patient has had one dose of COVID vaccine so far and is not a smoker. Patient denies loss of consciousness, shortness of breath, vomiting, diarrhea, dysuria, focal weakness, or any other complaints. NKDA.

## 2021-03-20 NOTE — H&P ADULT - HISTORY OF PRESENT ILLNESS
81 y/o female from home with history of HTN, hld, and DM presented to ED after a fall. As per daughter pt has been feeling flu like symptoms for 5 days with malaise, generalized weakness. Patient states that her left knee gave out this morning at 8:00 AM and could get up from floor until 3pm. Pt states she has been having multiple falls in the last few days. Pt does not use cane to ambulate. Patient states she had fever today associated with suprapubic tenderness. Pt denies chest pain, sob, n/v/d.  off note As per daughter pt had her first covid vaccine 3/9.

## 2021-03-20 NOTE — H&P ADULT - PROBLEM SELECTOR PLAN 4
uncontrolled DM   pt at home on metformin 500 qd   fu a1c   urine glu >100, bg >300   started on lantus 10 u and hss Pt noted to have elevated troponin .061  likely T2 in the setting of sepsis   denies chest pain   no st elevation on ekg   will send T2   will monitor on tele

## 2021-03-20 NOTE — ED PROVIDER NOTE - CARE PLAN
Principal Discharge DX:	Sepsis, due to unspecified organism, unspecified whether acute organ dysfunction present  Secondary Diagnosis:	Urinary tract infection with hematuria, site unspecified

## 2021-03-20 NOTE — ED ADULT TRIAGE NOTE - CHIEF COMPLAINT QUOTE
BIBA s/p fall, denies LOC, denies head or neck involvement, family states pt has been c/o weakness and gen malaise x 3 days, pt has fever and is hyperglycemic

## 2021-03-20 NOTE — ED PROVIDER NOTE - CLINICAL SUMMARY MEDICAL DECISION MAKING FREE TEXT BOX
79 y/o woman, h/o HTN, p/w fall at 8 am, lying on floor all day, and also c/o cough, subjective fever, vague chest discomfort, with mild head injury, pain to low back and left LE poorly localized, nonfocal exam with normal mental status--CT/X-ray imaging for r/o injuries, septic workup, RVP, EKG, admit.

## 2021-03-20 NOTE — H&P ADULT - ASSESSMENT
81 y/o female from home with history of HTN, hld, and DM presented to ED after a fall.  Pt noted to have UTI and admitted for further evaluation
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
## 2021-03-21 DIAGNOSIS — Z86.79 PERSONAL HISTORY OF OTHER DISEASES OF THE CIRCULATORY SYSTEM: ICD-10-CM

## 2021-03-21 DIAGNOSIS — Z29.9 ENCOUNTER FOR PROPHYLACTIC MEASURES, UNSPECIFIED: ICD-10-CM

## 2021-03-21 DIAGNOSIS — R77.8 OTHER SPECIFIED ABNORMALITIES OF PLASMA PROTEINS: ICD-10-CM

## 2021-03-21 DIAGNOSIS — A41.9 SEPSIS, UNSPECIFIED ORGANISM: ICD-10-CM

## 2021-03-21 DIAGNOSIS — N39.0 URINARY TRACT INFECTION, SITE NOT SPECIFIED: ICD-10-CM

## 2021-03-21 DIAGNOSIS — E11.9 TYPE 2 DIABETES MELLITUS WITHOUT COMPLICATIONS: ICD-10-CM

## 2021-03-21 DIAGNOSIS — U07.1 COVID-19: ICD-10-CM

## 2021-03-21 DIAGNOSIS — E78.00 PURE HYPERCHOLESTEROLEMIA, UNSPECIFIED: ICD-10-CM

## 2021-03-21 LAB
A1C WITH ESTIMATED AVERAGE GLUCOSE RESULT: 7.3 % — HIGH (ref 4–5.6)
ANION GAP SERPL CALC-SCNC: 9 MMOL/L — SIGNIFICANT CHANGE UP (ref 5–17)
BUN SERPL-MCNC: 12 MG/DL — SIGNIFICANT CHANGE UP (ref 7–18)
CALCIUM SERPL-MCNC: 8.2 MG/DL — LOW (ref 8.4–10.5)
CHLORIDE SERPL-SCNC: 108 MMOL/L — SIGNIFICANT CHANGE UP (ref 96–108)
CHOLEST SERPL-MCNC: 112 MG/DL — SIGNIFICANT CHANGE UP
CK SERPL-CCNC: 776 U/L — HIGH (ref 21–215)
CO2 SERPL-SCNC: 25 MMOL/L — SIGNIFICANT CHANGE UP (ref 22–31)
COVID-19 SPIKE DOMAIN AB INTERP: POSITIVE
COVID-19 SPIKE DOMAIN ANTIBODY RESULT: 23.5 U/ML — HIGH
CREAT SERPL-MCNC: 0.83 MG/DL — SIGNIFICANT CHANGE UP (ref 0.5–1.3)
ERYTHROCYTE [SEDIMENTATION RATE] IN BLOOD: 26 MM/HR — HIGH (ref 0–20)
ESTIMATED AVERAGE GLUCOSE: 163 MG/DL — HIGH (ref 68–114)
FERRITIN SERPL-MCNC: 381 NG/ML — HIGH (ref 15–150)
FERRITIN SERPL-MCNC: 424 NG/ML — HIGH (ref 15–150)
FOLATE SERPL-MCNC: >20 NG/ML — SIGNIFICANT CHANGE UP
GLUCOSE BLDC GLUCOMTR-MCNC: 154 MG/DL — HIGH (ref 70–99)
GLUCOSE BLDC GLUCOMTR-MCNC: 206 MG/DL — HIGH (ref 70–99)
GLUCOSE BLDC GLUCOMTR-MCNC: 219 MG/DL — HIGH (ref 70–99)
GLUCOSE BLDC GLUCOMTR-MCNC: 263 MG/DL — HIGH (ref 70–99)
GLUCOSE SERPL-MCNC: 201 MG/DL — HIGH (ref 70–99)
HCT VFR BLD CALC: 33.8 % — LOW (ref 34.5–45)
HDLC SERPL-MCNC: 40 MG/DL — LOW
HGB BLD-MCNC: 11.3 G/DL — LOW (ref 11.5–15.5)
IRON SATN MFR SERPL: 19 UG/DL — LOW (ref 40–150)
IRON SATN MFR SERPL: 9 % — LOW (ref 15–50)
LACTATE SERPL-SCNC: 1.1 MMOL/L — SIGNIFICANT CHANGE UP (ref 0.7–2)
LDH SERPL L TO P-CCNC: 256 U/L — HIGH (ref 120–225)
LIPID PNL WITH DIRECT LDL SERPL: 43 MG/DL — SIGNIFICANT CHANGE UP
MAGNESIUM SERPL-MCNC: 1.9 MG/DL — SIGNIFICANT CHANGE UP (ref 1.6–2.6)
MCHC RBC-ENTMCNC: 29.7 PG — SIGNIFICANT CHANGE UP (ref 27–34)
MCHC RBC-ENTMCNC: 33.4 GM/DL — SIGNIFICANT CHANGE UP (ref 32–36)
MCV RBC AUTO: 88.9 FL — SIGNIFICANT CHANGE UP (ref 80–100)
NON HDL CHOLESTEROL: 72 MG/DL — SIGNIFICANT CHANGE UP
NRBC # BLD: 0 /100 WBCS — SIGNIFICANT CHANGE UP (ref 0–0)
PHOSPHATE SERPL-MCNC: 2.6 MG/DL — SIGNIFICANT CHANGE UP (ref 2.5–4.5)
PLATELET # BLD AUTO: 119 K/UL — LOW (ref 150–400)
POTASSIUM SERPL-MCNC: 3.8 MMOL/L — SIGNIFICANT CHANGE UP (ref 3.5–5.3)
POTASSIUM SERPL-SCNC: 3.8 MMOL/L — SIGNIFICANT CHANGE UP (ref 3.5–5.3)
RBC # BLD: 3.8 M/UL — SIGNIFICANT CHANGE UP (ref 3.8–5.2)
RBC # BLD: 3.8 M/UL — SIGNIFICANT CHANGE UP (ref 3.8–5.2)
RBC # FLD: 12.4 % — SIGNIFICANT CHANGE UP (ref 10.3–14.5)
RETICS #: 31.9 K/UL — SIGNIFICANT CHANGE UP (ref 25–125)
RETICS/RBC NFR: 0.8 % — SIGNIFICANT CHANGE UP (ref 0.5–2.5)
SARS-COV-2 IGG+IGM SERPL QL IA: 23.5 U/ML — HIGH
SARS-COV-2 IGG+IGM SERPL QL IA: POSITIVE
SODIUM SERPL-SCNC: 142 MMOL/L — SIGNIFICANT CHANGE UP (ref 135–145)
TIBC SERPL-MCNC: 212 UG/DL — LOW (ref 250–450)
TRIGL SERPL-MCNC: 143 MG/DL — SIGNIFICANT CHANGE UP
TROPONIN I SERPL-MCNC: 0.07 NG/ML — HIGH (ref 0–0.04)
TROPONIN I SERPL-MCNC: 0.08 NG/ML — HIGH (ref 0–0.04)
TSH SERPL-MCNC: 3.53 UU/ML — SIGNIFICANT CHANGE UP (ref 0.34–4.82)
UIBC SERPL-MCNC: 193 UG/DL — SIGNIFICANT CHANGE UP (ref 110–370)
VIT B12 SERPL-MCNC: 837 PG/ML — SIGNIFICANT CHANGE UP (ref 232–1245)
WBC # BLD: 3.57 K/UL — LOW (ref 3.8–10.5)
WBC # FLD AUTO: 3.57 K/UL — LOW (ref 3.8–10.5)

## 2021-03-21 PROCEDURE — 99232 SBSQ HOSP IP/OBS MODERATE 35: CPT | Mod: GC

## 2021-03-21 RX ORDER — ACETAMINOPHEN 500 MG
650 TABLET ORAL EVERY 6 HOURS
Refills: 0 | Status: DISCONTINUED | OUTPATIENT
Start: 2021-03-21 | End: 2021-03-26

## 2021-03-21 RX ORDER — PANTOPRAZOLE SODIUM 20 MG/1
40 TABLET, DELAYED RELEASE ORAL
Refills: 0 | Status: DISCONTINUED | OUTPATIENT
Start: 2021-03-21 | End: 2021-03-26

## 2021-03-21 RX ORDER — ASPIRIN/CALCIUM CARB/MAGNESIUM 324 MG
81 TABLET ORAL DAILY
Refills: 0 | Status: DISCONTINUED | OUTPATIENT
Start: 2021-03-21 | End: 2021-03-26

## 2021-03-21 RX ORDER — CEFTRIAXONE 500 MG/1
1000 INJECTION, POWDER, FOR SOLUTION INTRAMUSCULAR; INTRAVENOUS EVERY 24 HOURS
Refills: 0 | Status: COMPLETED | OUTPATIENT
Start: 2021-03-21 | End: 2021-03-23

## 2021-03-21 RX ORDER — METOPROLOL TARTRATE 50 MG
25 TABLET ORAL
Refills: 0 | Status: DISCONTINUED | OUTPATIENT
Start: 2021-03-21 | End: 2021-03-26

## 2021-03-21 RX ORDER — ATORVASTATIN CALCIUM 80 MG/1
40 TABLET, FILM COATED ORAL AT BEDTIME
Refills: 0 | Status: DISCONTINUED | OUTPATIENT
Start: 2021-03-21 | End: 2021-03-26

## 2021-03-21 RX ORDER — METOCLOPRAMIDE HCL 10 MG
0 TABLET ORAL
Qty: 0 | Refills: 0 | DISCHARGE

## 2021-03-21 RX ORDER — METOPROLOL TARTRATE 50 MG
1 TABLET ORAL
Qty: 0 | Refills: 0 | DISCHARGE

## 2021-03-21 RX ORDER — LOVASTATIN 20 MG
1 TABLET ORAL
Qty: 0 | Refills: 0 | DISCHARGE

## 2021-03-21 RX ORDER — SODIUM CHLORIDE 9 MG/ML
1000 INJECTION INTRAMUSCULAR; INTRAVENOUS; SUBCUTANEOUS
Refills: 0 | Status: DISCONTINUED | OUTPATIENT
Start: 2021-03-21 | End: 2021-03-26

## 2021-03-21 RX ORDER — ASPIRIN/CALCIUM CARB/MAGNESIUM 324 MG
1 TABLET ORAL
Qty: 0 | Refills: 0 | DISCHARGE

## 2021-03-21 RX ORDER — INSULIN GLARGINE 100 [IU]/ML
10 INJECTION, SOLUTION SUBCUTANEOUS AT BEDTIME
Refills: 0 | Status: DISCONTINUED | OUTPATIENT
Start: 2021-03-21 | End: 2021-03-23

## 2021-03-21 RX ORDER — OMEPRAZOLE 10 MG/1
1 CAPSULE, DELAYED RELEASE ORAL
Qty: 0 | Refills: 0 | DISCHARGE

## 2021-03-21 RX ORDER — INSULIN LISPRO 100/ML
VIAL (ML) SUBCUTANEOUS
Refills: 0 | Status: DISCONTINUED | OUTPATIENT
Start: 2021-03-21 | End: 2021-03-26

## 2021-03-21 RX ADMIN — ATORVASTATIN CALCIUM 40 MILLIGRAM(S): 80 TABLET, FILM COATED ORAL at 21:47

## 2021-03-21 RX ADMIN — Medication 1: at 22:45

## 2021-03-21 RX ADMIN — SODIUM CHLORIDE 100 MILLILITER(S): 9 INJECTION INTRAMUSCULAR; INTRAVENOUS; SUBCUTANEOUS at 07:24

## 2021-03-21 RX ADMIN — Medication 25 MILLIGRAM(S): at 18:37

## 2021-03-21 RX ADMIN — Medication 650 MILLIGRAM(S): at 18:36

## 2021-03-21 RX ADMIN — Medication 2: at 18:37

## 2021-03-21 RX ADMIN — Medication 81 MILLIGRAM(S): at 12:27

## 2021-03-21 RX ADMIN — CEFTRIAXONE 100 MILLIGRAM(S): 500 INJECTION, POWDER, FOR SOLUTION INTRAMUSCULAR; INTRAVENOUS at 21:47

## 2021-03-21 RX ADMIN — PANTOPRAZOLE SODIUM 40 MILLIGRAM(S): 20 TABLET, DELAYED RELEASE ORAL at 07:24

## 2021-03-21 RX ADMIN — INSULIN GLARGINE 10 UNIT(S): 100 INJECTION, SOLUTION SUBCUTANEOUS at 02:04

## 2021-03-21 RX ADMIN — Medication 650 MILLIGRAM(S): at 19:00

## 2021-03-21 RX ADMIN — Medication 650 MILLIGRAM(S): at 09:00

## 2021-03-21 RX ADMIN — Medication 650 MILLIGRAM(S): at 08:02

## 2021-03-21 RX ADMIN — ENOXAPARIN SODIUM 40 MILLIGRAM(S): 100 INJECTION SUBCUTANEOUS at 12:27

## 2021-03-21 RX ADMIN — Medication 2: at 08:09

## 2021-03-21 RX ADMIN — INSULIN GLARGINE 10 UNIT(S): 100 INJECTION, SOLUTION SUBCUTANEOUS at 22:45

## 2021-03-21 NOTE — PROGRESS NOTE ADULT - SUBJECTIVE AND OBJECTIVE BOX
Patient is a 80y old  Female who presents with a chief complaint of fall (20 Mar 2021 23:50)    Patient was seen and examined at bedside   Still complains of supra pubic pain, and pain in left ankle and knee      INTERVAL HPI/OVERNIGHT EVENTS:  T(C): 36.7 (21 @ 15:35), Max: 38.9 (21 @ 19:45)  HR: 89 (21 @ 18:23) (78 - 100)  BP: 128/71 (21 @ 18:23) (110/67 - 186/83)  RR: 16 (21 @ 15:35) (16 - 18)  SpO2: 98% (21 @ 15:35) (93% - 98%)  Wt(kg): --  I&O's Summary      REVIEW OF SYSTEMS: denies fever, chills, SOB, palpitations, chest pain, abdominal pain, nausea, vomiting, diarrhea, constipation, dizziness    MEDICATIONS  (STANDING):  aspirin enteric coated 81 milliGRAM(s) Oral daily  atorvastatin 40 milliGRAM(s) Oral at bedtime  cefTRIAXone   IVPB 1000 milliGRAM(s) IV Intermittent every 24 hours  enoxaparin Injectable 40 milliGRAM(s) SubCutaneous daily  insulin glargine Injectable (LANTUS) 10 Unit(s) SubCutaneous at bedtime  insulin lispro (ADMELOG) corrective regimen sliding scale   SubCutaneous Before meals and at bedtime  metoprolol tartrate 25 milliGRAM(s) Oral two times a day  pantoprazole    Tablet 40 milliGRAM(s) Oral before breakfast  sodium chloride 0.9%. 1000 milliLiter(s) (100 mL/Hr) IV Continuous <Continuous>    MEDICATIONS  (PRN):  acetaminophen   Tablet .. 650 milliGRAM(s) Oral every 6 hours PRN Temp greater or equal to 38C (100.4F), Mild Pain (1 - 3), Moderate Pain (4 - 6)      PHYSICAL EXAM:  GENERAL: NAD  NECK: Supple, No JVD, Normal thyroid  NERVOUS SYSTEM:  Alert & Oriented X3, LE exam limited due to pain, no CN deficit, BL hand tremor noted  CHEST/LUNG: Clear to auscultation bilaterally; No rales, rhonchi, wheezing, or rubs  HEART: Regular rate and rhythm; No murmurs, rubs, or gallops  ABDOMEN: Soft, supra pubic tenderness, Nondistended; Bowel sounds present  EXTREMITIES:  2+ Peripheral Pulses, No clubbing, cyanosis, or edema  SKIN: No rashes or lesions    LABS:                        11.3   3.57  )-----------( 119      ( 21 Mar 2021 06:19 )             33.8       142  |  108  |  12  ----------------------------<  201<H>  3.8   |  25  |  0.83    Ca    8.2<L>      21 Mar 2021 06:19  Phos  2.6     03-21  Mg     1.9     03-21    TPro  7.3  /  Alb  3.3<L>  /  TBili  1.0  /  DBili  x   /  AST  91<H>  /  ALT  47  /  AlkPhos  141<H>  03-20    PT/INR - ( 20 Mar 2021 20:53 )   PT: 15.0 sec;   INR: 1.28 ratio         PTT - ( 20 Mar 2021 20:53 )  PTT:30.3 sec  Urinalysis Basic - ( 20 Mar 2021 21:53 )    Color: Yellow / Appearance: Slightly Turbid / S.020 / pH: x  Gluc: x / Ketone: Large  / Bili: Negative / Urobili: 1   Blood: x / Protein: 100 / Nitrite: Negative   Leuk Esterase: Moderate / RBC: 10-25 /HPF / WBC 26-50 /HPF   Sq Epi: x / Non Sq Epi: Many /HPF / Bacteria: Many /HPF      CAPILLARY BLOOD GLUCOSE      POCT Blood Glucose.: 206 mg/dL (21 Mar 2021 18:21)  POCT Blood Glucose.: 219 mg/dL (21 Mar 2021 08:04)  POCT Blood Glucose.: 263 mg/dL (21 Mar 2021 01:33)  POCT Blood Glucose.: 370 mg/dL (20 Mar 2021 19:53)

## 2021-03-21 NOTE — PROGRESS NOTE ADULT - ASSESSMENT
UTI  COVID 19 infection asymptomatic   NSTEMI  DM  HTN  HLD  Multiple falls   Adnexal cyst 1.4cm rightsided    Plan:  Cont ceftriaxone, follow urine culture   Trend trop to peak   Cont home medications for HTN and HLD  ISS   Not requiring oxygen, does not need remdesevir or dexamethasone   Monitor respiratory status closely  Monitor inflammatory markers   Pelvic US  Trend Ck  Cont IVF  PT eval   Full code

## 2021-03-22 LAB
ANION GAP SERPL CALC-SCNC: 8 MMOL/L — SIGNIFICANT CHANGE UP (ref 5–17)
BUN SERPL-MCNC: 10 MG/DL — SIGNIFICANT CHANGE UP (ref 7–18)
CALCIUM SERPL-MCNC: 8.3 MG/DL — LOW (ref 8.4–10.5)
CHLORIDE SERPL-SCNC: 104 MMOL/L — SIGNIFICANT CHANGE UP (ref 96–108)
CO2 SERPL-SCNC: 26 MMOL/L — SIGNIFICANT CHANGE UP (ref 22–31)
CREAT SERPL-MCNC: 0.7 MG/DL — SIGNIFICANT CHANGE UP (ref 0.5–1.3)
CULTURE RESULTS: SIGNIFICANT CHANGE UP
GLUCOSE BLDC GLUCOMTR-MCNC: 157 MG/DL — HIGH (ref 70–99)
GLUCOSE BLDC GLUCOMTR-MCNC: 183 MG/DL — HIGH (ref 70–99)
GLUCOSE BLDC GLUCOMTR-MCNC: 196 MG/DL — HIGH (ref 70–99)
GLUCOSE BLDC GLUCOMTR-MCNC: 226 MG/DL — HIGH (ref 70–99)
GLUCOSE SERPL-MCNC: 152 MG/DL — HIGH (ref 70–99)
HCT VFR BLD CALC: 33.6 % — LOW (ref 34.5–45)
HGB BLD-MCNC: 11.6 G/DL — SIGNIFICANT CHANGE UP (ref 11.5–15.5)
MAGNESIUM SERPL-MCNC: 1.7 MG/DL — SIGNIFICANT CHANGE UP (ref 1.6–2.6)
MCHC RBC-ENTMCNC: 29.8 PG — SIGNIFICANT CHANGE UP (ref 27–34)
MCHC RBC-ENTMCNC: 34.5 GM/DL — SIGNIFICANT CHANGE UP (ref 32–36)
MCV RBC AUTO: 86.4 FL — SIGNIFICANT CHANGE UP (ref 80–100)
NRBC # BLD: 0 /100 WBCS — SIGNIFICANT CHANGE UP (ref 0–0)
PHOSPHATE SERPL-MCNC: 2 MG/DL — LOW (ref 2.5–4.5)
PLATELET # BLD AUTO: 132 K/UL — LOW (ref 150–400)
POTASSIUM SERPL-MCNC: 3 MMOL/L — LOW (ref 3.5–5.3)
POTASSIUM SERPL-SCNC: 3 MMOL/L — LOW (ref 3.5–5.3)
RBC # BLD: 3.89 M/UL — SIGNIFICANT CHANGE UP (ref 3.8–5.2)
RBC # FLD: 12.4 % — SIGNIFICANT CHANGE UP (ref 10.3–14.5)
SODIUM SERPL-SCNC: 138 MMOL/L — SIGNIFICANT CHANGE UP (ref 135–145)
SPECIMEN SOURCE: SIGNIFICANT CHANGE UP
WBC # BLD: 5.3 K/UL — SIGNIFICANT CHANGE UP (ref 3.8–10.5)
WBC # FLD AUTO: 5.3 K/UL — SIGNIFICANT CHANGE UP (ref 3.8–10.5)

## 2021-03-22 PROCEDURE — 99232 SBSQ HOSP IP/OBS MODERATE 35: CPT | Mod: GC

## 2021-03-22 RX ORDER — POTASSIUM PHOSPHATE, MONOBASIC POTASSIUM PHOSPHATE, DIBASIC 236; 224 MG/ML; MG/ML
15 INJECTION, SOLUTION INTRAVENOUS ONCE
Refills: 0 | Status: COMPLETED | OUTPATIENT
Start: 2021-03-22 | End: 2021-03-22

## 2021-03-22 RX ORDER — ACETAMINOPHEN 500 MG
1000 TABLET ORAL ONCE
Refills: 0 | Status: COMPLETED | OUTPATIENT
Start: 2021-03-22 | End: 2021-03-22

## 2021-03-22 RX ORDER — POTASSIUM CHLORIDE 20 MEQ
40 PACKET (EA) ORAL EVERY 4 HOURS
Refills: 0 | Status: COMPLETED | OUTPATIENT
Start: 2021-03-22 | End: 2021-03-22

## 2021-03-22 RX ADMIN — ENOXAPARIN SODIUM 40 MILLIGRAM(S): 100 INJECTION SUBCUTANEOUS at 11:15

## 2021-03-22 RX ADMIN — Medication 1: at 07:59

## 2021-03-22 RX ADMIN — Medication 650 MILLIGRAM(S): at 16:53

## 2021-03-22 RX ADMIN — Medication 40 MILLIEQUIVALENT(S): at 16:27

## 2021-03-22 RX ADMIN — Medication 400 MILLIGRAM(S): at 05:50

## 2021-03-22 RX ADMIN — Medication 2: at 12:13

## 2021-03-22 RX ADMIN — Medication 40 MILLIEQUIVALENT(S): at 11:15

## 2021-03-22 RX ADMIN — CEFTRIAXONE 100 MILLIGRAM(S): 500 INJECTION, POWDER, FOR SOLUTION INTRAMUSCULAR; INTRAVENOUS at 21:02

## 2021-03-22 RX ADMIN — INSULIN GLARGINE 10 UNIT(S): 100 INJECTION, SOLUTION SUBCUTANEOUS at 21:36

## 2021-03-22 RX ADMIN — Medication 81 MILLIGRAM(S): at 11:15

## 2021-03-22 RX ADMIN — PANTOPRAZOLE SODIUM 40 MILLIGRAM(S): 20 TABLET, DELAYED RELEASE ORAL at 05:53

## 2021-03-22 RX ADMIN — POTASSIUM PHOSPHATE, MONOBASIC POTASSIUM PHOSPHATE, DIBASIC 62.5 MILLIMOLE(S): 236; 224 INJECTION, SOLUTION INTRAVENOUS at 12:14

## 2021-03-22 RX ADMIN — Medication 1: at 17:36

## 2021-03-22 RX ADMIN — Medication 1000 MILLIGRAM(S): at 07:37

## 2021-03-22 RX ADMIN — ATORVASTATIN CALCIUM 40 MILLIGRAM(S): 80 TABLET, FILM COATED ORAL at 21:36

## 2021-03-22 RX ADMIN — Medication 1: at 21:36

## 2021-03-22 RX ADMIN — Medication 25 MILLIGRAM(S): at 18:44

## 2021-03-22 RX ADMIN — Medication 650 MILLIGRAM(S): at 18:46

## 2021-03-22 NOTE — CHART NOTE - NSCHARTNOTEFT_GEN_A_CORE
Call team paged that pt had a temp of 102.9 this AM, pt positive for COVID infection. Chart reviewed- Blood cultures negative, will order 1 dose of IV Tylenol.

## 2021-03-22 NOTE — PROGRESS NOTE ADULT - PROBLEM SELECTOR PLAN 1
- presented s/p fall, weakness and malaise   - ED vitals fever 102 , wbc 3.5 low   - suprapubic tenderness  - UA positive  - Ucx & Bcx neg  - s/p IVF  - c/w ceftriaxone (2 of 3)

## 2021-03-22 NOTE — PROGRESS NOTE ADULT - PROBLEM SELECTOR PLAN 3
- resolved    - ED vitals fever 102 , wbc 3.5 low   - U/A pos  - Ucx & Bcx neg  - lactate 2.4 > 1.1  - s/p IVF bolus in ED   - c/w ceftriaxone (2 of 3) - resolved  - ED vitals fever 102 , wbc 3.5 low   - U/A pos  - Ucx & Bcx neg  - lactate 2.4 > 1.1  - s/p IVF bolus in ED   - c/w ceftriaxone (2 of 3)

## 2021-03-22 NOTE — PROGRESS NOTE ADULT - PROBLEM SELECTOR PLAN 4
- resolved    - noted to have elevated troponin .061  - no chest pain  - likely 2/2 sepsis    - EKG no GERALDO   - trops trended down - resolved  - noted to have elevated troponin .061  - no chest pain  - likely 2/2 sepsis    - EKG no GERALDO   - trops trended down

## 2021-03-22 NOTE — PHYSICAL THERAPY INITIAL EVALUATION ADULT - PERTINENT HX OF CURRENT PROBLEM, REHAB EVAL
HTN,  and DM presented to ED after a fall. As per daughter pt has been feeling flu like symptoms for 5 days with malaise, generalized weakness.

## 2021-03-22 NOTE — PROGRESS NOTE ADULT - ASSESSMENT
Patient is a 80 year old female, from home, w/ PMH of HTN, HLD, and DM presented to ED after a fall. Pt noted to have UTI and admitted for further evaluation.

## 2021-03-22 NOTE — PROGRESS NOTE ADULT - PROBLEM SELECTOR PLAN 5
- p/w multiple falls   - CTH no intracranial hemorrhage, mass effect or depressed skull fracture.  - CT cervical spine no displaced fracture or traumatic malalignment.  - CT lumbar spine no acute displaced fracture.    - PT onboard

## 2021-03-22 NOTE — PROGRESS NOTE ADULT - SUBJECTIVE AND OBJECTIVE BOX
PGY-1 Progress Note discussed with attending    PAGER #: [[phone]] TILL 5:00 PM  PLEASE CONTACT ON CALL TEAM:  - On Call Team (Please refer to Sid) FROM 5:00 PM - 8:30PM  - Nightfloat Team FROM 8:30 -7:30 AM    OVERNIGHT EVENTS:   - Grant fever overnight, s/p Tylenol. Patient reports no complaints. She is resting comfortably in bed.    REVIEW OF SYSTEMS:  CONSTITUTIONAL: No fever, weight loss, or fatigue  RESPIRATORY: No cough, wheezing, chills or hemoptysis; No shortness of breath  CARDIOVASCULAR: No chest pain, palpitations, dizziness, or leg swelling  GASTROINTESTINAL: No abdominal pain. No nausea, vomiting, or hematemesis; No diarrhea or constipation. No melena or hematochezia.  GENITOURINARY: No dysuria or hematuria, urinary frequency  NEUROLOGICAL: No headaches, memory loss, loss of strength, numbness, or tremors  SKIN: No itching, burning, rashes, or lesions     MEDICATIONS  (STANDING):  aspirin enteric coated 81 milliGRAM(s) Oral daily  atorvastatin 40 milliGRAM(s) Oral at bedtime  cefTRIAXone   IVPB 1000 milliGRAM(s) IV Intermittent every 24 hours  enoxaparin Injectable 40 milliGRAM(s) SubCutaneous daily  insulin glargine Injectable (LANTUS) 10 Unit(s) SubCutaneous at bedtime  insulin lispro (ADMELOG) corrective regimen sliding scale   SubCutaneous Before meals and at bedtime  metoprolol tartrate 25 milliGRAM(s) Oral two times a day  pantoprazole    Tablet 40 milliGRAM(s) Oral before breakfast  potassium chloride    Tablet ER 40 milliEquivalent(s) Oral every 4 hours  potassium phosphate IVPB 15 milliMole(s) IV Intermittent once  sodium chloride 0.9%. 1000 milliLiter(s) (100 mL/Hr) IV Continuous <Continuous>    MEDICATIONS  (PRN):  acetaminophen   Tablet .. 650 milliGRAM(s) Oral every 6 hours PRN Temp greater or equal to 38C (100.4F), Mild Pain (1 - 3), Moderate Pain (4 - 6)      Vital Signs Last 24 Hrs  T(C): 39 (22 Mar 2021 11:06), Max: 39.4 (22 Mar 2021 04:36)  T(F): 102.2 (22 Mar 2021 11:06), Max: 102.9 (22 Mar 2021 04:36)  HR: 97 (22 Mar 2021 11:06) (55 - 97)  BP: 116/69 (22 Mar 2021 11:06) (106/67 - 186/83)  BP(mean): --  RR: 19 (22 Mar 2021 11:06) (16 - 20)  SpO2: 94% (22 Mar 2021 11:06) (94% - 100%)    PHYSICAL EXAMINATION:  GENERAL: NAD, AAOx3, fever  HEAD: AT/NC  EYES: conjunctiva and sclera clear  NECK: supple, No JVD noted, Normal thyroid  CHEST/LUNG: CTABL; no rales, rhonchi, wheezing, or rubs  HEART: regular rate and rhythm; no murmurs, rubs, or gallops  ABDOMEN: soft, nontender, nondistended; Bowel sounds present  EXTREMITIES:  2+ Peripheral Pulses, No clubbing, cyanosis, or edema  SKIN: warm dry                          11.6   5.30  )-----------( 132      ( 22 Mar 2021 06:18 )             33.6     03-22    138  |  104  |  10  ----------------------------<  152<H>  3.0<L>   |  26  |  0.70    Ca    8.3<L>      22 Mar 2021 06:18  Phos  2.0     03-22  Mg     1.7     03-22    TPro  7.3  /  Alb  3.3<L>  /  TBili  1.0  /  DBili  x   /  AST  91<H>  /  ALT  47  /  AlkPhos  141<H>  03-20    LIVER FUNCTIONS - ( 20 Mar 2021 20:53 )  Alb: 3.3 g/dL / Pro: 7.3 g/dL / ALK PHOS: 141 U/L / ALT: 47 U/L DA / AST: 91 U/L / GGT: x           CARDIAC MARKERS ( 21 Mar 2021 06:19 )  0.071 ng/mL / x     / 776 U/L / x     / x      CARDIAC MARKERS ( 21 Mar 2021 01:04 )  0.078 ng/mL / x     / x     / x     / x      CARDIAC MARKERS ( 20 Mar 2021 20:53 )  0.061 ng/mL / x     / 783 U/L / x     / x          PT/INR - ( 20 Mar 2021 20:53 )   PT: 15.0 sec;   INR: 1.28 ratio         PTT - ( 20 Mar 2021 20:53 )  PTT:30.3 sec  SARS-CoV-2: Detected (20 Mar 2021 21:25)      CAPILLARY BLOOD GLUCOSE      POCT Blood Glucose.: 226 mg/dL (22 Mar 2021 11:42)  POCT Blood Glucose.: 157 mg/dL (22 Mar 2021 07:47)  POCT Blood Glucose.: 154 mg/dL (21 Mar 2021 21:45)  POCT Blood Glucose.: 206 mg/dL (21 Mar 2021 18:21)      RADIOLOGY & ADDITIONAL TESTS:

## 2021-03-23 LAB
ALBUMIN SERPL ELPH-MCNC: 2.3 G/DL — LOW (ref 3.5–5)
ALP SERPL-CCNC: 201 U/L — HIGH (ref 40–120)
ALT FLD-CCNC: 43 U/L DA — SIGNIFICANT CHANGE UP (ref 10–60)
ANION GAP SERPL CALC-SCNC: 10 MMOL/L — SIGNIFICANT CHANGE UP (ref 5–17)
AST SERPL-CCNC: 99 U/L — HIGH (ref 10–40)
BASOPHILS # BLD AUTO: 0.01 K/UL — SIGNIFICANT CHANGE UP (ref 0–0.2)
BASOPHILS NFR BLD AUTO: 0.2 % — SIGNIFICANT CHANGE UP (ref 0–2)
BILIRUB SERPL-MCNC: 1 MG/DL — SIGNIFICANT CHANGE UP (ref 0.2–1.2)
BUN SERPL-MCNC: 11 MG/DL — SIGNIFICANT CHANGE UP (ref 7–18)
CALCIUM SERPL-MCNC: 8.5 MG/DL — SIGNIFICANT CHANGE UP (ref 8.4–10.5)
CHLORIDE SERPL-SCNC: 105 MMOL/L — SIGNIFICANT CHANGE UP (ref 96–108)
CO2 SERPL-SCNC: 24 MMOL/L — SIGNIFICANT CHANGE UP (ref 22–31)
CREAT SERPL-MCNC: 0.75 MG/DL — SIGNIFICANT CHANGE UP (ref 0.5–1.3)
CRP SERPL-MCNC: 155 MG/L — HIGH
D DIMER BLD IA.RAPID-MCNC: 304 NG/ML DDU — HIGH
EOSINOPHIL # BLD AUTO: 0 K/UL — SIGNIFICANT CHANGE UP (ref 0–0.5)
EOSINOPHIL NFR BLD AUTO: 0 % — SIGNIFICANT CHANGE UP (ref 0–6)
FERRITIN SERPL-MCNC: 583 NG/ML — HIGH (ref 15–150)
GLUCOSE BLDC GLUCOMTR-MCNC: 133 MG/DL — HIGH (ref 70–99)
GLUCOSE BLDC GLUCOMTR-MCNC: 144 MG/DL — HIGH (ref 70–99)
GLUCOSE BLDC GLUCOMTR-MCNC: 168 MG/DL — HIGH (ref 70–99)
GLUCOSE BLDC GLUCOMTR-MCNC: 176 MG/DL — HIGH (ref 70–99)
GLUCOSE SERPL-MCNC: 152 MG/DL — HIGH (ref 70–99)
HCT VFR BLD CALC: 32.2 % — LOW (ref 34.5–45)
HGB BLD-MCNC: 10.9 G/DL — LOW (ref 11.5–15.5)
IMM GRANULOCYTES NFR BLD AUTO: 2.4 % — HIGH (ref 0–1.5)
LDH SERPL L TO P-CCNC: 441 U/L — HIGH (ref 120–225)
LYMPHOCYTES # BLD AUTO: 0.98 K/UL — LOW (ref 1–3.3)
LYMPHOCYTES # BLD AUTO: 16.6 % — SIGNIFICANT CHANGE UP (ref 13–44)
MAGNESIUM SERPL-MCNC: 1.8 MG/DL — SIGNIFICANT CHANGE UP (ref 1.6–2.6)
MCHC RBC-ENTMCNC: 29.3 PG — SIGNIFICANT CHANGE UP (ref 27–34)
MCHC RBC-ENTMCNC: 33.9 GM/DL — SIGNIFICANT CHANGE UP (ref 32–36)
MCV RBC AUTO: 86.6 FL — SIGNIFICANT CHANGE UP (ref 80–100)
MONOCYTES # BLD AUTO: 0.3 K/UL — SIGNIFICANT CHANGE UP (ref 0–0.9)
MONOCYTES NFR BLD AUTO: 5.1 % — SIGNIFICANT CHANGE UP (ref 2–14)
NEUTROPHILS # BLD AUTO: 4.46 K/UL — SIGNIFICANT CHANGE UP (ref 1.8–7.4)
NEUTROPHILS NFR BLD AUTO: 75.7 % — SIGNIFICANT CHANGE UP (ref 43–77)
NRBC # BLD: 0 /100 WBCS — SIGNIFICANT CHANGE UP (ref 0–0)
PHOSPHATE SERPL-MCNC: 1.6 MG/DL — LOW (ref 2.5–4.5)
PLATELET # BLD AUTO: 129 K/UL — LOW (ref 150–400)
POTASSIUM SERPL-MCNC: 3.7 MMOL/L — SIGNIFICANT CHANGE UP (ref 3.5–5.3)
POTASSIUM SERPL-SCNC: 3.7 MMOL/L — SIGNIFICANT CHANGE UP (ref 3.5–5.3)
PROT SERPL-MCNC: 6 G/DL — SIGNIFICANT CHANGE UP (ref 6–8.3)
RBC # BLD: 3.72 M/UL — LOW (ref 3.8–5.2)
RBC # FLD: 12.5 % — SIGNIFICANT CHANGE UP (ref 10.3–14.5)
SODIUM SERPL-SCNC: 139 MMOL/L — SIGNIFICANT CHANGE UP (ref 135–145)
WBC # BLD: 5.89 K/UL — SIGNIFICANT CHANGE UP (ref 3.8–10.5)
WBC # FLD AUTO: 5.89 K/UL — SIGNIFICANT CHANGE UP (ref 3.8–10.5)

## 2021-03-23 PROCEDURE — 99232 SBSQ HOSP IP/OBS MODERATE 35: CPT | Mod: GC

## 2021-03-23 RX ORDER — ENOXAPARIN SODIUM 100 MG/ML
40 INJECTION SUBCUTANEOUS
Refills: 0 | Status: DISCONTINUED | OUTPATIENT
Start: 2021-03-23 | End: 2021-03-26

## 2021-03-23 RX ORDER — CEFTRIAXONE 500 MG/1
1000 INJECTION, POWDER, FOR SOLUTION INTRAMUSCULAR; INTRAVENOUS EVERY 24 HOURS
Refills: 0 | Status: DISCONTINUED | OUTPATIENT
Start: 2021-03-24 | End: 2021-03-26

## 2021-03-23 RX ORDER — MAGNESIUM SULFATE 500 MG/ML
1 VIAL (ML) INJECTION ONCE
Refills: 0 | Status: COMPLETED | OUTPATIENT
Start: 2021-03-23 | End: 2021-03-23

## 2021-03-23 RX ORDER — POTASSIUM PHOSPHATE, MONOBASIC POTASSIUM PHOSPHATE, DIBASIC 236; 224 MG/ML; MG/ML
30 INJECTION, SOLUTION INTRAVENOUS ONCE
Refills: 0 | Status: COMPLETED | OUTPATIENT
Start: 2021-03-23 | End: 2021-03-23

## 2021-03-23 RX ORDER — INSULIN GLARGINE 100 [IU]/ML
12 INJECTION, SOLUTION SUBCUTANEOUS AT BEDTIME
Refills: 0 | Status: DISCONTINUED | OUTPATIENT
Start: 2021-03-23 | End: 2021-03-26

## 2021-03-23 RX ADMIN — ENOXAPARIN SODIUM 40 MILLIGRAM(S): 100 INJECTION SUBCUTANEOUS at 18:08

## 2021-03-23 RX ADMIN — Medication 650 MILLIGRAM(S): at 18:27

## 2021-03-23 RX ADMIN — Medication 650 MILLIGRAM(S): at 19:05

## 2021-03-23 RX ADMIN — Medication 650 MILLIGRAM(S): at 05:32

## 2021-03-23 RX ADMIN — INSULIN GLARGINE 12 UNIT(S): 100 INJECTION, SOLUTION SUBCUTANEOUS at 21:25

## 2021-03-23 RX ADMIN — Medication 81 MILLIGRAM(S): at 13:48

## 2021-03-23 RX ADMIN — Medication 1: at 21:26

## 2021-03-23 RX ADMIN — Medication 1: at 08:31

## 2021-03-23 RX ADMIN — Medication 25 MILLIGRAM(S): at 18:27

## 2021-03-23 RX ADMIN — PANTOPRAZOLE SODIUM 40 MILLIGRAM(S): 20 TABLET, DELAYED RELEASE ORAL at 05:32

## 2021-03-23 RX ADMIN — POTASSIUM PHOSPHATE, MONOBASIC POTASSIUM PHOSPHATE, DIBASIC 83.33 MILLIMOLE(S): 236; 224 INJECTION, SOLUTION INTRAVENOUS at 11:25

## 2021-03-23 RX ADMIN — Medication 25 MILLIGRAM(S): at 05:32

## 2021-03-23 RX ADMIN — Medication 650 MILLIGRAM(S): at 07:10

## 2021-03-23 RX ADMIN — ATORVASTATIN CALCIUM 40 MILLIGRAM(S): 80 TABLET, FILM COATED ORAL at 21:23

## 2021-03-23 RX ADMIN — CEFTRIAXONE 100 MILLIGRAM(S): 500 INJECTION, POWDER, FOR SOLUTION INTRAMUSCULAR; INTRAVENOUS at 21:23

## 2021-03-23 RX ADMIN — Medication 100 GRAM(S): at 13:48

## 2021-03-23 NOTE — PROGRESS NOTE ADULT - SUBJECTIVE AND OBJECTIVE BOX
PGY-1 Progress Note discussed with attending    PAGER #: [[phone]] TILL 5:00 PM  PLEASE CONTACT ON CALL TEAM:  - On Call Team (Please refer to Sid) FROM 5:00 PM - 8:30PM  - Nightfloat Team FROM 8:30 -7:30 AM    CHIEF COMPLAINT & BRIEF HOSPITAL COURSE:    INTERVAL HPI/OVERNIGHT EVENTS:       REVIEW OF SYSTEMS:  CONSTITUTIONAL: No fever, weight loss, or fatigue  RESPIRATORY: No cough, wheezing, chills or hemoptysis; No shortness of breath  CARDIOVASCULAR: No chest pain, palpitations, dizziness, or leg swelling  GASTROINTESTINAL: No abdominal pain. No nausea, vomiting, or hematemesis; No diarrhea or constipation. No melena or hematochezia.  GENITOURINARY: No dysuria or hematuria, urinary frequency  NEUROLOGICAL: No headaches, memory loss, loss of strength, numbness, or tremors  SKIN: No itching, burning, rashes, or lesions     MEDICATIONS  (STANDING):  aspirin enteric coated 81 milliGRAM(s) Oral daily  atorvastatin 40 milliGRAM(s) Oral at bedtime  cefTRIAXone   IVPB 1000 milliGRAM(s) IV Intermittent every 24 hours  enoxaparin Injectable 40 milliGRAM(s) SubCutaneous daily  insulin glargine Injectable (LANTUS) 10 Unit(s) SubCutaneous at bedtime  insulin lispro (ADMELOG) corrective regimen sliding scale   SubCutaneous Before meals and at bedtime  metoprolol tartrate 25 milliGRAM(s) Oral two times a day  pantoprazole    Tablet 40 milliGRAM(s) Oral before breakfast  sodium chloride 0.9%. 1000 milliLiter(s) (100 mL/Hr) IV Continuous <Continuous>    MEDICATIONS  (PRN):  acetaminophen   Tablet .. 650 milliGRAM(s) Oral every 6 hours PRN Temp greater or equal to 38C (100.4F), Mild Pain (1 - 3), Moderate Pain (4 - 6)      Vital Signs Last 24 Hrs  T(C): 37.4 (23 Mar 2021 09:30), Max: 39.3 (23 Mar 2021 05:50)  T(F): 99.3 (23 Mar 2021 09:30), Max: 102.8 (23 Mar 2021 05:50)  HR: 90 (23 Mar 2021 05:50) (81 - 97)  BP: 144/57 (23 Mar 2021 05:50) (116/62 - 144/57)  BP(mean): --  RR: 16 (23 Mar 2021 05:50) (16 - 19)  SpO2: 96% (23 Mar 2021 05:50) (94% - 100%)    PHYSICAL EXAMINATION:  GENERAL: NAD, well built  HEAD:  Atraumatic, Normocephalic  EYES:  conjunctiva and sclera clear  NECK: Supple, No JVD, Normal thyroid  CHEST/LUNG: Clear to auscultation. Clear to percussion bilaterally; No rales, rhonchi, wheezing, or rubs  HEART: Regular rate and rhythm; No murmurs, rubs, or gallops  ABDOMEN: Soft, Nontender, Nondistended; Bowel sounds present  NERVOUS SYSTEM:  Alert & Oriented X3,    EXTREMITIES:  2+ Peripheral Pulses, No clubbing, cyanosis, or edema  SKIN: warm dry                          10.9   5.89  )-----------( 129      ( 23 Mar 2021 07:54 )             32.2     03-23    139  |  105  |  11  ----------------------------<  152<H>  3.7   |  24  |  0.75    Ca    8.5      23 Mar 2021 07:54  Phos  1.6     03-23  Mg     1.8     03-23    TPro  6.0  /  Alb  2.3<L>  /  TBili  1.0  /  DBili  x   /  AST  99<H>  /  ALT  43  /  AlkPhos  201<H>  03-23    LIVER FUNCTIONS - ( 23 Mar 2021 07:54 )  Alb: 2.3 g/dL / Pro: 6.0 g/dL / ALK PHOS: 201 U/L / ALT: 43 U/L DA / AST: 99 U/L / GGT: x                       CAPILLARY BLOOD GLUCOSE  CAPILLARY BLOOD GLUCOSE      POCT Blood Glucose.: 168 mg/dL (23 Mar 2021 08:15)    CAPILLARY BLOOD GLUCOSE      POCT Blood Glucose.: 168 mg/dL (23 Mar 2021 08:15)  POCT Blood Glucose.: 196 mg/dL (22 Mar 2021 21:18)  POCT Blood Glucose.: 183 mg/dL (22 Mar 2021 16:43)  POCT Blood Glucose.: 226 mg/dL (22 Mar 2021 11:42)      RADIOLOGY & ADDITIONAL TESTS:                   PGY-1 Progress Note discussed with attending    PAGER #: [[phone]] TILL 5:00 PM  PLEASE CONTACT ON CALL TEAM:  - On Call Team (Please refer to Sid) FROM 5:00 PM - 8:30PM  - Nightfloat Team FROM 8:30 -7:30 AM      INTERVAL HPI/OVERNIGHT EVENTS:   patient examined bedside, she is saturating well on RA, PT recommended home PT w a walker , tele showing some PVCs, EKG showed NSR w PVCs , tele dced , lantus was increased from 10 to 12 units for better bd sugar control , lovenox was increased to BID.       REVIEW OF SYSTEMS:  CONSTITUTIONAL: No fever, weight loss, or fatigue  RESPIRATORY: No cough, wheezing, chills or hemoptysis; No shortness of breath  CARDIOVASCULAR: No chest pain, palpitations, dizziness, or leg swelling  GASTROINTESTINAL: No abdominal pain. No nausea, vomiting, or hematemesis; No diarrhea or constipation. No melena or hematochezia.  GENITOURINARY: No dysuria or hematuria, urinary frequency  NEUROLOGICAL: mild left leg pain   SKIN: No itching, burning, rashes, or lesions     MEDICATIONS  (STANDING):  aspirin enteric coated 81 milliGRAM(s) Oral daily  atorvastatin 40 milliGRAM(s) Oral at bedtime  cefTRIAXone   IVPB 1000 milliGRAM(s) IV Intermittent every 24 hours  enoxaparin Injectable 40 milliGRAM(s) SubCutaneous daily  insulin glargine Injectable (LANTUS) 10 Unit(s) SubCutaneous at bedtime  insulin lispro (ADMELOG) corrective regimen sliding scale   SubCutaneous Before meals and at bedtime  metoprolol tartrate 25 milliGRAM(s) Oral two times a day  pantoprazole    Tablet 40 milliGRAM(s) Oral before breakfast  sodium chloride 0.9%. 1000 milliLiter(s) (100 mL/Hr) IV Continuous <Continuous>    MEDICATIONS  (PRN):  acetaminophen   Tablet .. 650 milliGRAM(s) Oral every 6 hours PRN Temp greater or equal to 38C (100.4F), Mild Pain (1 - 3), Moderate Pain (4 - 6)      Vital Signs Last 24 Hrs  T(C): 37.4 (23 Mar 2021 09:30), Max: 39.3 (23 Mar 2021 05:50)  T(F): 99.3 (23 Mar 2021 09:30), Max: 102.8 (23 Mar 2021 05:50)  HR: 90 (23 Mar 2021 05:50) (81 - 97)  BP: 144/57 (23 Mar 2021 05:50) (116/62 - 144/57)  BP(mean): --  RR: 16 (23 Mar 2021 05:50) (16 - 19)  SpO2: 96% (23 Mar 2021 05:50) (94% - 100%)    PHYSICAL EXAMINATION:  GENERAL: NAD,  HEAD:  Atraumatic, Normocephalic  EYES:  conjunctiva and sclera clear  NECK: Supple, No JVD, Normal thyroid  CHEST/LUNG: Clear to auscultation. Clear to percussion bilaterally; No rales, rhonchi, wheezing, or rubs  HEART: Regular rate and rhythm; No murmurs, rubs, or gallops  ABDOMEN: Soft, Nontender, Nondistended; Bowel sounds present  NERVOUS SYSTEM:  Alert & Oriented X3,    EXTREMITIES:  2+ Peripheral Pulses,   SKIN: warm dry                          10.9   5.89  )-----------( 129      ( 23 Mar 2021 07:54 )             32.2     03-23    139  |  105  |  11  ----------------------------<  152<H>  3.7   |  24  |  0.75    Ca    8.5      23 Mar 2021 07:54  Phos  1.6     03-23  Mg     1.8     03-23    TPro  6.0  /  Alb  2.3<L>  /  TBili  1.0  /  DBili  x   /  AST  99<H>  /  ALT  43  /  AlkPhos  201<H>  03-23    LIVER FUNCTIONS - ( 23 Mar 2021 07:54 )  Alb: 2.3 g/dL / Pro: 6.0 g/dL / ALK PHOS: 201 U/L / ALT: 43 U/L DA / AST: 99 U/L / GGT: x                       CAPILLARY BLOOD GLUCOSE  CAPILLARY BLOOD GLUCOSE      POCT Blood Glucose.: 168 mg/dL (23 Mar 2021 08:15)    CAPILLARY BLOOD GLUCOSE      POCT Blood Glucose.: 168 mg/dL (23 Mar 2021 08:15)  POCT Blood Glucose.: 196 mg/dL (22 Mar 2021 21:18)  POCT Blood Glucose.: 183 mg/dL (22 Mar 2021 16:43)  POCT Blood Glucose.: 226 mg/dL (22 Mar 2021 11:42)      RADIOLOGY & ADDITIONAL TESTS:

## 2021-03-23 NOTE — PROGRESS NOTE ADULT - ASSESSMENT
Patient is a 80 year old female, from home, w/ PMH of HTN, HLD, and DM presented to ED after a fall. Pt noted to have UTI and admitted for further evaluation. Patient is a 80 year old female, from home, w/ PMH of HTN, HLD, and DM presented to ED after a fall. Pt noted to have UTI and COVID pneumonia

## 2021-03-23 NOTE — PROGRESS NOTE ADULT - PROBLEM SELECTOR PLAN 2
- asxs  - no Remdesivir and decadron for now  - monitor COVID inflammatory markers TTS - pt is COVID pos  - no Remdesivir and decadron for now  - monitor COVID inflammatory markers TTS - pt is COVID pos, mild infection as no hypoxia or respiratory symptoms at this time  - no Remdesivir and decadron for now, unless moderate-severe disease  - monitor COVID inflammatory markers TTS and oxygen

## 2021-03-23 NOTE — PROGRESS NOTE ADULT - PROBLEM SELECTOR PLAN 4
- resolved  - noted to have elevated troponin .061  - no chest pain  - likely 2/2 sepsis    - EKG no GERALDO   - trops trended down

## 2021-03-23 NOTE — PROGRESS NOTE ADULT - PROBLEM SELECTOR PLAN 3
- resolved  - ED vitals fever 102 , wbc 3.5 low   - U/A pos  - Ucx & Bcx neg  - lactate 2.4 > 1.1  - s/p IVF bolus in ED   - c/w ceftriaxone (2 of 3) - resolved  - ED vitals fever 102 , wbc 3.5 low   - U/A pos  - Ucx & Bcx neg  - lactate 2.4 > 1.1  - s/p IVF bolus in ED   - on rocephin - resolved  - ED vitals fever 102 , wbc 3.5 low   - U/A pos  - Ucx & Bcx neg  - lactate 2.4 > 1.1  - s/p IVF bolus in ED   - on rocephin  -f/u repeat blood culture

## 2021-03-23 NOTE — PROGRESS NOTE ADULT - PROBLEM SELECTOR PLAN 9
IMPROVE VTE Individual Risk Assessment  RISK                                                         Points  [  ] Previous VTE                                      3  [  ] Thrombophilia                                   2  [  ] Lower limb paralysis                         2 (unable to hold up >15 seconds)    [  ] Current Cancer                                  2       (within 6 months)  [  ] Immobilization > 24 hrs                    1  [  ] ICU/CCU stay > 24 hrs                         1  [  ] Age > 60                                              1   lovenox qd for dvt ppx IMPROVE VTE Individual Risk Assessment  RISK                                                         Points  [  ] Previous VTE                                      3  [  ] Thrombophilia                                   2  [  ] Lower limb paralysis                         2 (unable to hold up >15 seconds)    [  ] Current Cancer                                  2       (within 6 months)  [  ] Immobilization > 24 hrs                    1  [  ] ICU/CCU stay > 24 hrs                         1  [  ] Age > 60                                              1   lovenox BId for dvt ppx

## 2021-03-23 NOTE — PROGRESS NOTE ADULT - PROBLEM SELECTOR PLAN 1
- presented s/p fall, weakness and malaise   - ED vitals fever 102 , wbc 3.5 low   - suprapubic tenderness  - UA positive  - Ucx & Bcx neg  - s/p IVF  - c/w ceftriaxone (2 of 3) - presented s/p fall, weakness and malaise   - ED vitals fever 102 , wbc 3.5 low   - suprapubic tenderness  - UA positive  - Ucx & Bcx neg  - c/w ceftriaxone day 3

## 2021-03-23 NOTE — PROGRESS NOTE ADULT - PROBLEM SELECTOR PLAN 5
- p/w multiple falls   - CTH no intracranial hemorrhage, mass effect or depressed skull fracture.  - CT cervical spine no displaced fracture or traumatic malalignment.  - CT lumbar spine no acute displaced fracture.    - PT onboard - p/w multiple falls   - CTH no intracranial hemorrhage, mass effect or depressed skull fracture.  - CT cervical spine no displaced fracture or traumatic malalignment.  - CT lumbar spine no acute displaced fracture.  - PT home PT with walker

## 2021-03-23 NOTE — PROGRESS NOTE ADULT - PROBLEM SELECTOR PLAN 6
- h/o uncontrolled DM, on metformin 500 qd at home  - a1c 7.3   - c/w lantus 10u qHs and HSS - h/o  DM, on metformin 500 qd at home  - a1c 7.3   - c/w lantus 12u qHs and HSS - h/o  DM, on metformin 500 qd at home  - a1c 7.3   - c/w lantus 12u qHs and HSS  -pt is to be dced on metformin 500 BID

## 2021-03-24 LAB
ALBUMIN SERPL ELPH-MCNC: 2.4 G/DL — LOW (ref 3.5–5)
ALP SERPL-CCNC: 243 U/L — HIGH (ref 40–120)
ALT FLD-CCNC: 41 U/L DA — SIGNIFICANT CHANGE UP (ref 10–60)
ANION GAP SERPL CALC-SCNC: 8 MMOL/L — SIGNIFICANT CHANGE UP (ref 5–17)
AST SERPL-CCNC: 90 U/L — HIGH (ref 10–40)
BILIRUB SERPL-MCNC: 1 MG/DL — SIGNIFICANT CHANGE UP (ref 0.2–1.2)
BUN SERPL-MCNC: 11 MG/DL — SIGNIFICANT CHANGE UP (ref 7–18)
CALCIUM SERPL-MCNC: 8.3 MG/DL — LOW (ref 8.4–10.5)
CHLORIDE SERPL-SCNC: 106 MMOL/L — SIGNIFICANT CHANGE UP (ref 96–108)
CO2 SERPL-SCNC: 27 MMOL/L — SIGNIFICANT CHANGE UP (ref 22–31)
CREAT SERPL-MCNC: 0.69 MG/DL — SIGNIFICANT CHANGE UP (ref 0.5–1.3)
GLUCOSE BLDC GLUCOMTR-MCNC: 107 MG/DL — HIGH (ref 70–99)
GLUCOSE BLDC GLUCOMTR-MCNC: 117 MG/DL — HIGH (ref 70–99)
GLUCOSE BLDC GLUCOMTR-MCNC: 121 MG/DL — HIGH (ref 70–99)
GLUCOSE BLDC GLUCOMTR-MCNC: 99 MG/DL — SIGNIFICANT CHANGE UP (ref 70–99)
GLUCOSE SERPL-MCNC: 91 MG/DL — SIGNIFICANT CHANGE UP (ref 70–99)
HCT VFR BLD CALC: 31 % — LOW (ref 34.5–45)
HGB BLD-MCNC: 10.7 G/DL — LOW (ref 11.5–15.5)
MAGNESIUM SERPL-MCNC: 2.1 MG/DL — SIGNIFICANT CHANGE UP (ref 1.6–2.6)
MCHC RBC-ENTMCNC: 29.9 PG — SIGNIFICANT CHANGE UP (ref 27–34)
MCHC RBC-ENTMCNC: 34.5 GM/DL — SIGNIFICANT CHANGE UP (ref 32–36)
MCV RBC AUTO: 86.6 FL — SIGNIFICANT CHANGE UP (ref 80–100)
NRBC # BLD: 0 /100 WBCS — SIGNIFICANT CHANGE UP (ref 0–0)
PHOSPHATE SERPL-MCNC: 2.6 MG/DL — SIGNIFICANT CHANGE UP (ref 2.5–4.5)
PLATELET # BLD AUTO: 150 K/UL — SIGNIFICANT CHANGE UP (ref 150–400)
POTASSIUM SERPL-MCNC: 3.7 MMOL/L — SIGNIFICANT CHANGE UP (ref 3.5–5.3)
POTASSIUM SERPL-SCNC: 3.7 MMOL/L — SIGNIFICANT CHANGE UP (ref 3.5–5.3)
PROT SERPL-MCNC: 5.9 G/DL — LOW (ref 6–8.3)
RBC # BLD: 3.58 M/UL — LOW (ref 3.8–5.2)
RBC # FLD: 12.7 % — SIGNIFICANT CHANGE UP (ref 10.3–14.5)
SODIUM SERPL-SCNC: 141 MMOL/L — SIGNIFICANT CHANGE UP (ref 135–145)
WBC # BLD: 4.24 K/UL — SIGNIFICANT CHANGE UP (ref 3.8–10.5)
WBC # FLD AUTO: 4.24 K/UL — SIGNIFICANT CHANGE UP (ref 3.8–10.5)

## 2021-03-24 PROCEDURE — 99232 SBSQ HOSP IP/OBS MODERATE 35: CPT | Mod: GC

## 2021-03-24 RX ADMIN — Medication 81 MILLIGRAM(S): at 12:56

## 2021-03-24 RX ADMIN — CEFTRIAXONE 100 MILLIGRAM(S): 500 INJECTION, POWDER, FOR SOLUTION INTRAMUSCULAR; INTRAVENOUS at 22:06

## 2021-03-24 RX ADMIN — Medication 25 MILLIGRAM(S): at 05:40

## 2021-03-24 RX ADMIN — INSULIN GLARGINE 12 UNIT(S): 100 INJECTION, SOLUTION SUBCUTANEOUS at 22:05

## 2021-03-24 RX ADMIN — Medication 25 MILLIGRAM(S): at 19:33

## 2021-03-24 RX ADMIN — ENOXAPARIN SODIUM 40 MILLIGRAM(S): 100 INJECTION SUBCUTANEOUS at 05:40

## 2021-03-24 RX ADMIN — PANTOPRAZOLE SODIUM 40 MILLIGRAM(S): 20 TABLET, DELAYED RELEASE ORAL at 06:16

## 2021-03-24 RX ADMIN — ENOXAPARIN SODIUM 40 MILLIGRAM(S): 100 INJECTION SUBCUTANEOUS at 17:47

## 2021-03-24 RX ADMIN — ATORVASTATIN CALCIUM 40 MILLIGRAM(S): 80 TABLET, FILM COATED ORAL at 22:05

## 2021-03-24 RX ADMIN — Medication 650 MILLIGRAM(S): at 22:47

## 2021-03-24 NOTE — DISCHARGE NOTE PROVIDER - NSDCMRMEDTOKEN_GEN_ALL_CORE_FT
aspirin 81 mg oral delayed release tablet: 1 tab(s) orally once a day  lisinopril 40 mg oral tablet: 1 tab(s) orally once a day  lovastatin 20 mg oral tablet, extended release: 1 tab(s) orally once a day (at bedtime)  metFORMIN 500 mg oral tablet: 1 tab(s) orally once a day  metoprolol succinate 50 mg oral capsule, extended release: 1 cap(s) orally once a day  omeprazole 20 mg oral delayed release tablet: 1 tab(s) orally once a day   aspirin 81 mg oral delayed release tablet: 1 tab(s) orally once a day  lovastatin 20 mg oral tablet, extended release: 1 tab(s) orally once a day (at bedtime)  metFORMIN 500 mg oral tablet: 1 tab(s) orally 2 times a day  metoprolol succinate 50 mg oral capsule, extended release: 1 cap(s) orally once a day  omeprazole 20 mg oral delayed release tablet: 1 tab(s) orally once a day  walker:

## 2021-03-24 NOTE — PROGRESS NOTE ADULT - PROBLEM SELECTOR PLAN 2
- pt is COVID pos, mild infection as no hypoxia or respiratory symptoms at this time  - no Remdesivir and decadron for now, unless moderate-severe disease  - monitor COVID inflammatory markers TTS and oxygen

## 2021-03-24 NOTE — DISCHARGE NOTE PROVIDER - HOSPITAL COURSE
79 y/o female from home with history of HTN, hld, and DM presented to ED after a fall. As per daughter pt has been feeling flu like symptoms for 5 days prior to admission with malaise, generalized weakness. Patient states that her left knee gave out on day of admission  at 8:00 AM and couldnot  get up from floor until 3pm. Pt states she has been having multiple falls in the last few days.  As per daughter pt had her first covid vaccine 3/9.  patient was found to be COVID positive, did not require oxygen supplementation so did not qualify to remdsivir nor decadron .   weakness and malaise   for sepsis , patient  had  fever 102, , wbc 3.5 low ,  UA was found to be  positive to infection , Ucx & Bcx were negative  patient was started on  ceftriaxone , repeat blood culture came back >>>  for Fall, pateint presented with  multiple falls . CTH no intracranial hemorrhage, mass effect or depressed skull fracture. CT cervical spine showed no displaced fracture or traumatic malalignment. CT lumbar spine showed no acute displaced fracture. PT evaluated the patient and recommended  home PT with walker.      79 y/o female from home with history of HTN, hld, and DM presented to ED after a fall. As per daughter pt has been feeling flu like symptoms for 5 days prior to admission with malaise, generalized weakness. Patient states that her left knee gave out on day of admission  at 8:00 AM and couldnot  get up from floor until 3pm. Pt states she has been having multiple falls in the last few days.  As per daughter pt had her first covid vaccine 3/9.  patient was found to be COVID positive, did not require oxygen supplementation so did not qualify to remdsivir nor decadron .   for sepsis , patient  had  fever 102, , wbc 3.5 low ,  UA was found to be  positive to infection , Ucx & Bcx were negative  patient was started on  ceftriaxone , repeat blood culture came back negative  for Fall, pateint presented with  multiple falls . CTH no intracranial hemorrhage, mass effect or depressed skull fracture. CT cervical spine showed no displaced fracture or traumatic malalignment. CT lumbar spine showed no acute displaced fracture. xray left ankle and knee showed no fracture. PT evaluated the patient and recommended  home PT with walker.      79 y/o female from home with history of HTN, hld, and DM presented to ED after a fall. As per daughter pt has been feeling flu like symptoms for 5 days prior to admission with malaise, generalized weakness. Patient states that her left knee gave out on day of admission  at 8:00 AM and couldnot  get up from floor until 3pm. Pt states she has been having multiple falls in the last few days.  As per daughter pt had her first covid vaccine 3/9.  patient was found to be COVID positive, did not require oxygen supplementation so did not qualify to remdsivir nor decadron .   for sepsis , patient  had  fever 102, , wbc 3.5 low ,  UA was found to be  positive to infection , Ucx & Bcx were negative.  patient was started on  ceftriaxone , repeat blood culture came back negative  for Fall, pateint presented with  multiple falls . CTH no intracranial hemorrhage, mass effect or depressed skull fracture. CT cervical spine showed no displaced fracture or traumatic malalignment. CT lumbar spine showed no acute displaced fracture. xray left ankle and knee showed no fracture. PT evaluated the patient and recommended  home PT with walker.      79 y/o female from home with history of HTN, hld, and DM presented to ED after a fall. As per daughter pt has been feeling flu like symptoms for 5 days prior to admission with malaise, generalized weakness. Patient states that her left knee gave out on day of admission  at 8:00 AM and couldnot  get up from floor until 3pm. Pt states she has been having multiple falls in the last few days.  As per daughter pt had her first covid vaccine 3/9.  patient was found to be COVID positive, did not require oxygen supplementation so did not qualify to remdsivir nor decadron .   for sepsis , patient  had  fever 102, , wbc 3.5 low ,  UA was found to be  positive to infection , Ucx & Bcx were negative.  patient was started on  ceftriaxone , repeat blood culture came back negative, CTA chest was done and was negative for PE.   for Fall, pateint presented with  multiple falls . CTH no intracranial hemorrhage, mass effect or depressed skull fracture. CT cervical spine showed no displaced fracture or traumatic malalignment. CT lumbar spine showed no acute displaced fracture. xray left ankle and knee showed no fracture. PT evaluated the patient and recommended  home PT with walker.

## 2021-03-24 NOTE — PROGRESS NOTE ADULT - SUBJECTIVE AND OBJECTIVE BOX
PGY-1 Progress Note discussed with attending    PAGER #: [[phone]] TILL 5:00 PM  PLEASE CONTACT ON CALL TEAM:  - On Call Team (Please refer to Sid) FROM 5:00 PM - 8:30PM  - Nightfloat Team FROM 8:30 -7:30 AM      INTERVAL HPI/OVERNIGHT EVENTS:   patient examined bed side, patient spiked fever last night 100 c and didnot spike fever since then , will f/u blood culture and if negative and no fever , possible dc tomorrow.     REVIEW OF SYSTEMS:  CONSTITUTIONAL: No fever, weight loss, or fatigue  RESPIRATORY: No cough, wheezing, chills or hemoptysis; No shortness of breath  CARDIOVASCULAR: No chest pain, palpitations, dizziness, or leg swelling  GASTROINTESTINAL: No abdominal pain. No nausea, vomiting, or hematemesis; No diarrhea or constipation. No melena or hematochezia.  GENITOURINARY: No dysuria or hematuria, urinary frequency  NEUROLOGICAL: No headaches, memory loss, loss of strength, numbness, or tremors  SKIN: No itching, burning, rashes, or lesions     MEDICATIONS  (STANDING):  aspirin enteric coated 81 milliGRAM(s) Oral daily  atorvastatin 40 milliGRAM(s) Oral at bedtime  cefTRIAXone   IVPB 1000 milliGRAM(s) IV Intermittent every 24 hours  enoxaparin Injectable 40 milliGRAM(s) SubCutaneous two times a day  insulin glargine Injectable (LANTUS) 12 Unit(s) SubCutaneous at bedtime  insulin lispro (ADMELOG) corrective regimen sliding scale   SubCutaneous Before meals and at bedtime  metoprolol tartrate 25 milliGRAM(s) Oral two times a day  pantoprazole    Tablet 40 milliGRAM(s) Oral before breakfast  sodium chloride 0.9%. 1000 milliLiter(s) (100 mL/Hr) IV Continuous <Continuous>    MEDICATIONS  (PRN):  acetaminophen   Tablet .. 650 milliGRAM(s) Oral every 6 hours PRN Temp greater or equal to 38C (100.4F), Mild Pain (1 - 3), Moderate Pain (4 - 6)      Vital Signs Last 24 Hrs  T(C): 37.5 (24 Mar 2021 13:35), Max: 38.9 (23 Mar 2021 18:24)  T(F): 99.5 (24 Mar 2021 13:35), Max: 102 (23 Mar 2021 18:24)  HR: 80 (24 Mar 2021 13:35) (72 - 893)  BP: 136/67 (24 Mar 2021 13:35) (125/54 - 136/67)  BP(mean): --  RR: 18 (24 Mar 2021 13:35) (16 - 19)  SpO2: 95% (24 Mar 2021 13:35) (94% - 97%)    PHYSICAL EXAMINATION:  GENERAL: NAD  HEAD:  Atraumatic, Normocephalic  EYES:  conjunctiva and sclera clear  NECK: Supple, No JVD, Normal thyroid  CHEST/LUNG: Clear to auscultation. Clear to percussion bilaterally; No rales, rhonchi, wheezing, or rubs  HEART: Regular rate and rhythm; No murmurs, rubs, or gallops  ABDOMEN: Soft, Nontender, Nondistended; Bowel sounds present  NERVOUS SYSTEM:  Alert & Oriented X3,    EXTREMITIES:  2+ Peripheral Pulses, No clubbing, cyanosis, or edema  SKIN: warm dry                          10.7   4.24  )-----------( 150      ( 24 Mar 2021 07:34 )             31.0     03-24    141  |  106  |  11  ----------------------------<  91  3.7   |  27  |  0.69    Ca    8.3<L>      24 Mar 2021 07:34  Phos  2.6     03-24  Mg     2.1     03-24    TPro  5.9<L>  /  Alb  2.4<L>  /  TBili  1.0  /  DBili  x   /  AST  90<H>  /  ALT  41  /  AlkPhos  243<H>  03-24    LIVER FUNCTIONS - ( 24 Mar 2021 07:34 )  Alb: 2.4 g/dL / Pro: 5.9 g/dL / ALK PHOS: 243 U/L / ALT: 41 U/L DA / AST: 90 U/L / GGT: x                       CAPILLARY BLOOD GLUCOSE  CAPILLARY BLOOD GLUCOSE      POCT Blood Glucose.: 121 mg/dL (24 Mar 2021 11:22)    CAPILLARY BLOOD GLUCOSE      POCT Blood Glucose.: 121 mg/dL (24 Mar 2021 11:22)  POCT Blood Glucose.: 99 mg/dL (24 Mar 2021 07:46)  POCT Blood Glucose.: 176 mg/dL (23 Mar 2021 21:04)  POCT Blood Glucose.: 133 mg/dL (23 Mar 2021 16:55)      RADIOLOGY & ADDITIONAL TESTS:

## 2021-03-24 NOTE — PROGRESS NOTE ADULT - PROBLEM SELECTOR PLAN 1
- presented s/p fall, weakness and malaise   - ED vitals fever 102 , wbc 3.5 low   - suprapubic tenderness  - UA positive  - Ucx & Bcx neg  - c/w ceftriaxone day 4

## 2021-03-24 NOTE — PROGRESS NOTE ADULT - PROBLEM SELECTOR PLAN 3
- resolved  - ED vitals fever 102 , wbc 3.5 low   - U/A pos  - Ucx & Bcx neg  - lactate 2.4 > 1.1  - s/p IVF bolus in ED   - on rocephin  -f/u repeat blood culture

## 2021-03-24 NOTE — PROGRESS NOTE ADULT - ASSESSMENT
Patient is a 80 year old female, from home, w/ PMH of HTN, HLD, and DM presented to ED after a fall. Pt noted to have UTI and COVID pneumonia

## 2021-03-24 NOTE — DISCHARGE NOTE PROVIDER - NSCORESITESY/N_GEN_A_CORE_RD
At [facility], we strive to deliver an exceptional experience to you, every time we see you. If you receive a survey, please complete it as we do value your feedback.  If you have MyChart, you can expect to receive results automatically within 24 hours of their completion.  Your provider will send a note interpreting your results as well.   If you do not have MyChart, you should receive your results in about a week by mail.    Your care team:                            Family Medicine Internal Medicine   MD Mario Blanco MD Shantel Branch-Fleming, MD Katya Georgiev PA-C Megan Hill, APRCHRISTINA Pate, MD Pediatrics   Jeremias Arthur, PASophyC  Ivett Raymundo, MD Em Berry APRN CNP   MD Ana Cristina Henderson MD Deborah Mielke, MD Kim Thein, APRN CNP      Clinic hours: Monday - Thursday 7 am-7 pm; Fridays 7 am-5 pm.   Urgent care: Monday - Friday 11 am-9 pm; Saturday and Sunday 9 am-5 pm.  Pharmacy : Monday -Thursday 8 am-8 pm; Friday 8 am-6 pm; Saturday and Sunday 9 am-5 pm.     Clinic: [phone]   Pharmacy: [phone]        
Yes

## 2021-03-24 NOTE — DISCHARGE NOTE PROVIDER - NSDCCPCAREPLAN_GEN_ALL_CORE_FT
PRINCIPAL DISCHARGE DIAGNOSIS  Diagnosis: Sepsis, due to unspecified organism, unspecified whether acute organ dysfunction present  Assessment and Plan of Treatment:       SECONDARY DISCHARGE DIAGNOSES  Diagnosis: Urinary tract infection with hematuria, site unspecified  Assessment and Plan of Treatment:     Diagnosis: COVID-19  Assessment and Plan of Treatment: COVID-19    Diagnosis: Elevated troponin  Assessment and Plan of Treatment: Elevated troponin     PRINCIPAL DISCHARGE DIAGNOSIS  Diagnosis: COVID-19  Assessment and Plan of Treatment: You were found to be COVID positive, You did not require oxygen supplementation so did not qualify to remdsivir nor decadron . You were started on supportive treatment.   CORONAVIRUS INSTRUCTIONS:   Based on your current clinical status and stability, it has been determined that you no longer need hospitalization and can recover while remaining in self-quarantine at home. You should follow the prevention steps below until a healthcare provider or local or state health department says you can return to your normal activities.   1. You should restrict activities outside your home, except for getting medical care.   2. Do not go to work, school, or public areas.   3. Avoid using public transportation, ride-sharing, or taxis.   4. Separate yourself from other people and animals in your home as much as possible.  When you are around other people (e.g., sharing a room or vehicle) you should wear a facemask.  5. Wash your hands often with soap and water for at least 20 seconds, especially after blowing your nose, coughing, or sneezing; going to the bathroom; and before eating or preparing food.  6. Cover your mouth and nose with a tissue when you cough or sneeze. Throw used tissues in a lined trash can. Immediately wash your hands with soap and water for at least 20 seconds  7. High touch surfaces include counters, tabletops, doorknobs, bathroom fixtures, toilets, phones, keyboards, tablets, and bedside tables.  8. Avoid sharing dishes, drinking glasses, cups, eating utensils, towels, or bedding with other people or pets in your home. After using these items, they should be washed thoroughly with soap and water.  You are strongly advised to seek prompt medical attention if your illness worsens or you develop new symptoms like fever or difficulty breathing.        SECONDARY DISCHARGE DIAGNOSES  Diagnosis: Fall  Assessment and Plan of Treatment: You  presented with   fall . CT Head showed   no intracranial hemorrhage, mass effect or depressed skull fracture. CT cervical spine showed no displaced fracture or traumatic malalignment. CT lumbar spine showed no acute displaced fracture. xray left ankle and knee showed no fracture. PT evaluated You and recommended  home PT with walker.    Diagnosis: UTI (urinary tract infection)  Assessment and Plan of Treatment: You presented with  fever 102, , wbc 3.5 low , urinalysis was found to be  positive to infection , urine and blood cultures were negative. You finished a full course or    ceftriaxone 5 days , repeat blood culture came back negative to infection. You are recommended to follow up with your PCP after discharge.    Diagnosis: Elevated troponin  Assessment and Plan of Treatment: You presented with Elevated troponin, EKG was within normal , no chest pain and troponins trended down . you are recommended to follow up with your PCP after discharge.    Diagnosis: Diabetes  Assessment and Plan of Treatment: Maintaining blood glucose level within normal range.  - You have a history of diabetes  - Your HbA1c is 7.3  -you were given lantus 12 units and sliding scale while in hospital .  - You are recommended to continue on metformin 500 twice daily instead of once daily .  - You should continue to take your medication regimen regularly as prescribed  - Please follow up with your primary care provider/endocrinologist within a week of discharge.  - You need to continue monitoring your blood sugar levels closely.  - Please maintain healthy lifestyle by eating healthy diabetic regimen, weight loss and exercise regularly as tolerated.  Make sure you get your HgA1c checked every three months.  Keep a log of your blood glucose results and always take it with you to your doctor appointments.  Keep a list of your current medications including injectables and over the counter medications and bring this medication list with you to all your doctor appointments.  If you have not seen your ophthalmologist this year call for appointment.  Check your feet daily for redness, sores, or openings. Do not self treat. If no improvement in two days call your primary care physician for an appointment.  Low blood sugar (hypoglycemia) is a blood sugar below 70mg/dl. Check your blood sugar if you feel signs/symptoms of hypoglycemia. If your blood sugar is below 70 take 15 grams of carbohydrates (ex 4 oz of apple juice, 3-4 glucose tablets, or 4-6 oz of regular soda) wait 15 minutes and repeat blood sugar to make sure it comes up above 70.  If your blood sugar is above 70 and you are due for a meal, have a meal.  If you are not due for a meal have a snack.  This snack helps keeps your blood sugar at a safe range.      Diagnosis: History of hypertension  Assessment and Plan of Treatment: Blood Pressure Control , Please continue current medication regimen, and follow up with your PCP  - You have a history of Hypertension.      - You should continue on the current antihypertensive regimen regularly.  - You blood pressure should be within 140-120/80-90.  - You should follow-up with your PCP within 1 week of your discharge for routine blood pressure monitoring at your next visit.  - Notify your doctor if you have any of the following symptoms:   (Dizziness, Lightheadedness, Blurry vision, Headache, Chest pain, Shortness of breath.)  - You should maintain healthy lifestyle by eating healthy low salt diet, avoid fatty food, weight loss, exercise regularly as tolerated 30 mins X 3 time per week.      Diagnosis: Hypercholesterolemia  Assessment and Plan of Treatment: You had past history of Hypercholesterolemia.  Continue with home medication and follow up with your PCP       PRINCIPAL DISCHARGE DIAGNOSIS  Diagnosis: COVID-19  Assessment and Plan of Treatment: You were found to be COVID positive, You did not require oxygen supplementation so did not qualify to remdsivir nor decadron . You were started on supportive treatment.   CORONAVIRUS INSTRUCTIONS:   Based on your current clinical status and stability, it has been determined that you no longer need hospitalization and can recover while remaining in self-quarantine at home. You should follow the prevention steps below until a healthcare provider or local or state health department says you can return to your normal activities.   1. You should restrict activities outside your home, except for getting medical care.   2. Do not go to work, school, or public areas.   3. Avoid using public transportation, ride-sharing, or taxis.   4. Separate yourself from other people and animals in your home as much as possible.  When you are around other people (e.g., sharing a room or vehicle) you should wear a facemask.  5. Wash your hands often with soap and water for at least 20 seconds, especially after blowing your nose, coughing, or sneezing; going to the bathroom; and before eating or preparing food.  6. Cover your mouth and nose with a tissue when you cough or sneeze. Throw used tissues in a lined trash can. Immediately wash your hands with soap and water for at least 20 seconds  7. High touch surfaces include counters, tabletops, doorknobs, bathroom fixtures, toilets, phones, keyboards, tablets, and bedside tables.  8. Avoid sharing dishes, drinking glasses, cups, eating utensils, towels, or bedding with other people or pets in your home. After using these items, they should be washed thoroughly with soap and water.  You are strongly advised to seek prompt medical attention if your illness worsens or you develop new symptoms like fever or difficulty breathing.        SECONDARY DISCHARGE DIAGNOSES  Diagnosis: Fall  Assessment and Plan of Treatment: You  presented with   fall . CT Head showed   no intracranial hemorrhage, mass effect or depressed skull fracture. CT cervical spine showed no displaced fracture or traumatic malalignment. CT lumbar spine showed no acute displaced fracture. xray left ankle and knee showed no fracture. PT evaluated You and recommended  home PT with walker.    Diagnosis: UTI (urinary tract infection)  Assessment and Plan of Treatment: You presented with  fever 102, , wbc 3.5 low , urinalysis was found to be  positive to infection , urine and blood cultures were negative. You finished a full course of ceftriaxone 5 days , repeat blood culture came back negative to infection. You are recommended to follow up with your PCP after discharge.    Diagnosis: Elevated troponin  Assessment and Plan of Treatment: You presented with Elevated troponin, EKG was within normal , no chest pain and troponins trended down . you are recommended to follow up with your PCP after discharge.    Diagnosis: Diabetes  Assessment and Plan of Treatment: Maintaining blood glucose level within normal range.  - You have a history of diabetes  - Your HbA1c is 7.3  -you were given lantus 12 units and sliding scale while in hospital .  - You are recommended to continue on metformin 500 twice daily instead of once daily .  - You should continue to take your medication regimen regularly as prescribed  - Please follow up with your primary care provider/endocrinologist within a week of discharge.  - You need to continue monitoring your blood sugar levels closely.  - Please maintain healthy lifestyle by eating healthy diabetic regimen, weight loss and exercise regularly as tolerated.  Make sure you get your HgA1c checked every three months.  Keep a log of your blood glucose results and always take it with you to your doctor appointments.  Keep a list of your current medications including injectables and over the counter medications and bring this medication list with you to all your doctor appointments.  If you have not seen your ophthalmologist this year call for appointment.  Check your feet daily for redness, sores, or openings. Do not self treat. If no improvement in two days call your primary care physician for an appointment.  Low blood sugar (hypoglycemia) is a blood sugar below 70mg/dl. Check your blood sugar if you feel signs/symptoms of hypoglycemia. If your blood sugar is below 70 take 15 grams of carbohydrates (ex 4 oz of apple juice, 3-4 glucose tablets, or 4-6 oz of regular soda) wait 15 minutes and repeat blood sugar to make sure it comes up above 70.  If your blood sugar is above 70 and you are due for a meal, have a meal.  If you are not due for a meal have a snack.  This snack helps keeps your blood sugar at a safe range.      Diagnosis: History of hypertension  Assessment and Plan of Treatment: Blood Pressure Control , Please continue current medication regimen, and follow up with your PCP  - You have a history of Hypertension.    - your home medication lisinopril was discontinued, you are recommended to continue on metoprolol only for now given controlled blood pressure and follow up with your PCP after discharge for adjustment of blood pressure medications if needed.   - You blood pressure should be within 140-120/80-90.  - You should follow-up with your PCP within 1 week of your discharge for routine blood pressure monitoring at your next visit.  - Notify your doctor if you have any of the following symptoms:   (Dizziness, Lightheadedness, Blurry vision, Headache, Chest pain, Shortness of breath.)  - You should maintain healthy lifestyle by eating healthy low salt diet, avoid fatty food, weight loss, exercise regularly as tolerated 30 mins X 3 time per week.      Diagnosis: Hypercholesterolemia  Assessment and Plan of Treatment: You had past history of Hypercholesterolemia.  Continue with home medication and follow up with your PCP       PRINCIPAL DISCHARGE DIAGNOSIS  Diagnosis: COVID-19  Assessment and Plan of Treatment: You were found to be COVID positive, You did not require oxygen supplementation so did not qualify to remdsivir nor decadron . You were started on supportive treatment. CTA chest was done and was negative for any clots, you are spiking fevers likely related to COVID.   CORONAVIRUS INSTRUCTIONS:   Based on your current clinical status and stability, it has been determined that you no longer need hospitalization and can recover while remaining in self-quarantine at home. You should follow the prevention steps below until a healthcare provider or local or state health department says you can return to your normal activities.   1. You should restrict activities outside your home, except for getting medical care.   2. Do not go to work, school, or public areas.   3. Avoid using public transportation, ride-sharing, or taxis.   4. Separate yourself from other people and animals in your home as much as possible.  When you are around other people (e.g., sharing a room or vehicle) you should wear a facemask.  5. Wash your hands often with soap and water for at least 20 seconds, especially after blowing your nose, coughing, or sneezing; going to the bathroom; and before eating or preparing food.  6. Cover your mouth and nose with a tissue when you cough or sneeze. Throw used tissues in a lined trash can. Immediately wash your hands with soap and water for at least 20 seconds  7. High touch surfaces include counters, tabletops, doorknobs, bathroom fixtures, toilets, phones, keyboards, tablets, and bedside tables.  8. Avoid sharing dishes, drinking glasses, cups, eating utensils, towels, or bedding with other people or pets in your home. After using these items, they should be washed thoroughly with soap and water.  You are strongly advised to seek prompt medical attention if your illness worsens or you develop new symptoms like fever or difficulty breathing.        SECONDARY DISCHARGE DIAGNOSES  Diagnosis: Fall  Assessment and Plan of Treatment: You  presented with   fall . CT Head showed   no intracranial hemorrhage, mass effect or depressed skull fracture. CT cervical spine showed no displaced fracture or traumatic malalignment. CT lumbar spine showed no acute displaced fracture. xray left ankle and knee showed no fracture. PT evaluated You and recommended  home PT with walker.    Diagnosis: UTI (urinary tract infection)  Assessment and Plan of Treatment: You presented with  fever 102, , wbc 3.5 low , urinalysis was found to be  positive to infection , urine and blood cultures were negative. You finished a full course of ceftriaxone 5 days , repeat blood culture came back negative to infection. You are recommended to follow up with your PCP after discharge.    Diagnosis: Elevated troponin  Assessment and Plan of Treatment: You presented with Elevated troponin, EKG was within normal , no chest pain and troponins trended down . you are recommended to follow up with your PCP after discharge.    Diagnosis: Diabetes  Assessment and Plan of Treatment: Maintaining blood glucose level within normal range.  - You have a history of diabetes  - Your HbA1c is 7.3  -you were given lantus 12 units and sliding scale while in hospital .  - You are recommended to continue on metformin 500 twice daily instead of once daily .  - You should continue to take your medication regimen regularly as prescribed  - Please follow up with your primary care provider/endocrinologist within a week of discharge.  - You need to continue monitoring your blood sugar levels closely.  - Please maintain healthy lifestyle by eating healthy diabetic regimen, weight loss and exercise regularly as tolerated.  Make sure you get your HgA1c checked every three months.  Keep a log of your blood glucose results and always take it with you to your doctor appointments.  Keep a list of your current medications including injectables and over the counter medications and bring this medication list with you to all your doctor appointments.  If you have not seen your ophthalmologist this year call for appointment.  Check your feet daily for redness, sores, or openings. Do not self treat. If no improvement in two days call your primary care physician for an appointment.  Low blood sugar (hypoglycemia) is a blood sugar below 70mg/dl. Check your blood sugar if you feel signs/symptoms of hypoglycemia. If your blood sugar is below 70 take 15 grams of carbohydrates (ex 4 oz of apple juice, 3-4 glucose tablets, or 4-6 oz of regular soda) wait 15 minutes and repeat blood sugar to make sure it comes up above 70.  If your blood sugar is above 70 and you are due for a meal, have a meal.  If you are not due for a meal have a snack.  This snack helps keeps your blood sugar at a safe range.      Diagnosis: History of hypertension  Assessment and Plan of Treatment: Blood Pressure Control , Please continue current medication regimen, and follow up with your PCP  - You have a history of Hypertension.    - your home medication lisinopril was discontinued, you are recommended to continue on metoprolol only for now given controlled blood pressure and follow up with your PCP after discharge for adjustment of blood pressure medications if needed.   - You blood pressure should be within 140-120/80-90.  - You should follow-up with your PCP within 1 week of your discharge for routine blood pressure monitoring at your next visit.  - Notify your doctor if you have any of the following symptoms:   (Dizziness, Lightheadedness, Blurry vision, Headache, Chest pain, Shortness of breath.)  - You should maintain healthy lifestyle by eating healthy low salt diet, avoid fatty food, weight loss, exercise regularly as tolerated 30 mins X 3 time per week.      Diagnosis: Hypercholesterolemia  Assessment and Plan of Treatment: You had past history of Hypercholesterolemia.  Continue with home medication and follow up with your PCP

## 2021-03-24 NOTE — DISCHARGE NOTE PROVIDER - ATTENDING COMMENTS
See progress note from day of discharge 3/25 Patient seen and examined on day of discharge 3/26, plan of care discussed w/ medical team. Patient medically stable for discharge home w/ home PT. Continue ASA for dvt ppx, increase metformin to 500 bid to further optimize DM. Hold lisinopril on discharge.    Rest as per hospital course.

## 2021-03-24 NOTE — PROGRESS NOTE ADULT - PROBLEM SELECTOR PLAN 9
IMPROVE VTE Individual Risk Assessment  RISK                                                         Points  [  ] Previous VTE                                      3  [  ] Thrombophilia                                   2  [  ] Lower limb paralysis                         2 (unable to hold up >15 seconds)    [  ] Current Cancer                                  2       (within 6 months)  [  ] Immobilization > 24 hrs                    1  [  ] ICU/CCU stay > 24 hrs                         1  [  ] Age > 60                                              1   lovenox BId for dvt ppx

## 2021-03-25 LAB
ALBUMIN SERPL ELPH-MCNC: 2.3 G/DL — LOW (ref 3.5–5)
ALP SERPL-CCNC: 368 U/L — HIGH (ref 40–120)
ALT FLD-CCNC: 46 U/L DA — SIGNIFICANT CHANGE UP (ref 10–60)
ANION GAP SERPL CALC-SCNC: 11 MMOL/L — SIGNIFICANT CHANGE UP (ref 5–17)
AST SERPL-CCNC: 100 U/L — HIGH (ref 10–40)
BILIRUB SERPL-MCNC: 1.1 MG/DL — SIGNIFICANT CHANGE UP (ref 0.2–1.2)
BUN SERPL-MCNC: 7 MG/DL — SIGNIFICANT CHANGE UP (ref 7–18)
CALCIUM SERPL-MCNC: 8.3 MG/DL — LOW (ref 8.4–10.5)
CHLORIDE SERPL-SCNC: 104 MMOL/L — SIGNIFICANT CHANGE UP (ref 96–108)
CO2 SERPL-SCNC: 26 MMOL/L — SIGNIFICANT CHANGE UP (ref 22–31)
CREAT SERPL-MCNC: 0.54 MG/DL — SIGNIFICANT CHANGE UP (ref 0.5–1.3)
GLUCOSE BLDC GLUCOMTR-MCNC: 108 MG/DL — HIGH (ref 70–99)
GLUCOSE BLDC GLUCOMTR-MCNC: 116 MG/DL — HIGH (ref 70–99)
GLUCOSE BLDC GLUCOMTR-MCNC: 85 MG/DL — SIGNIFICANT CHANGE UP (ref 70–99)
GLUCOSE BLDC GLUCOMTR-MCNC: 94 MG/DL — SIGNIFICANT CHANGE UP (ref 70–99)
GLUCOSE SERPL-MCNC: 76 MG/DL — SIGNIFICANT CHANGE UP (ref 70–99)
HCT VFR BLD CALC: 31.4 % — LOW (ref 34.5–45)
HGB BLD-MCNC: 10.8 G/DL — LOW (ref 11.5–15.5)
MAGNESIUM SERPL-MCNC: 2.1 MG/DL — SIGNIFICANT CHANGE UP (ref 1.6–2.6)
MCHC RBC-ENTMCNC: 29.9 PG — SIGNIFICANT CHANGE UP (ref 27–34)
MCHC RBC-ENTMCNC: 34.4 GM/DL — SIGNIFICANT CHANGE UP (ref 32–36)
MCV RBC AUTO: 87 FL — SIGNIFICANT CHANGE UP (ref 80–100)
NRBC # BLD: 0 /100 WBCS — SIGNIFICANT CHANGE UP (ref 0–0)
PHOSPHATE SERPL-MCNC: 2.8 MG/DL — SIGNIFICANT CHANGE UP (ref 2.5–4.5)
PLATELET # BLD AUTO: 174 K/UL — SIGNIFICANT CHANGE UP (ref 150–400)
POTASSIUM SERPL-MCNC: 3 MMOL/L — LOW (ref 3.5–5.3)
POTASSIUM SERPL-SCNC: 3 MMOL/L — LOW (ref 3.5–5.3)
PROT SERPL-MCNC: 6 G/DL — SIGNIFICANT CHANGE UP (ref 6–8.3)
RBC # BLD: 3.61 M/UL — LOW (ref 3.8–5.2)
RBC # FLD: 12.5 % — SIGNIFICANT CHANGE UP (ref 10.3–14.5)
SARS-COV-2 RNA SPEC QL NAA+PROBE: DETECTED
SODIUM SERPL-SCNC: 141 MMOL/L — SIGNIFICANT CHANGE UP (ref 135–145)
WBC # BLD: 4.13 K/UL — SIGNIFICANT CHANGE UP (ref 3.8–10.5)
WBC # FLD AUTO: 4.13 K/UL — SIGNIFICANT CHANGE UP (ref 3.8–10.5)

## 2021-03-25 PROCEDURE — 99239 HOSP IP/OBS DSCHRG MGMT >30: CPT | Mod: GC

## 2021-03-25 PROCEDURE — 99232 SBSQ HOSP IP/OBS MODERATE 35: CPT | Mod: GC

## 2021-03-25 PROCEDURE — 71275 CT ANGIOGRAPHY CHEST: CPT | Mod: 26

## 2021-03-25 RX ORDER — METFORMIN HYDROCHLORIDE 850 MG/1
1 TABLET ORAL
Qty: 0 | Refills: 0 | DISCHARGE

## 2021-03-25 RX ORDER — METFORMIN HYDROCHLORIDE 850 MG/1
1 TABLET ORAL
Qty: 60 | Refills: 0
Start: 2021-03-25 | End: 2021-04-23

## 2021-03-25 RX ORDER — LISINOPRIL 2.5 MG/1
1 TABLET ORAL
Qty: 0 | Refills: 0 | DISCHARGE

## 2021-03-25 RX ORDER — POTASSIUM CHLORIDE 20 MEQ
40 PACKET (EA) ORAL EVERY 4 HOURS
Refills: 0 | Status: COMPLETED | OUTPATIENT
Start: 2021-03-25 | End: 2021-03-25

## 2021-03-25 RX ADMIN — PANTOPRAZOLE SODIUM 40 MILLIGRAM(S): 20 TABLET, DELAYED RELEASE ORAL at 05:50

## 2021-03-25 RX ADMIN — Medication 25 MILLIGRAM(S): at 17:22

## 2021-03-25 RX ADMIN — ENOXAPARIN SODIUM 40 MILLIGRAM(S): 100 INJECTION SUBCUTANEOUS at 05:49

## 2021-03-25 RX ADMIN — ENOXAPARIN SODIUM 40 MILLIGRAM(S): 100 INJECTION SUBCUTANEOUS at 17:19

## 2021-03-25 RX ADMIN — Medication 40 MILLIEQUIVALENT(S): at 13:41

## 2021-03-25 RX ADMIN — Medication 40 MILLIEQUIVALENT(S): at 08:51

## 2021-03-25 RX ADMIN — Medication 81 MILLIGRAM(S): at 13:41

## 2021-03-25 RX ADMIN — INSULIN GLARGINE 12 UNIT(S): 100 INJECTION, SOLUTION SUBCUTANEOUS at 21:45

## 2021-03-25 RX ADMIN — ATORVASTATIN CALCIUM 40 MILLIGRAM(S): 80 TABLET, FILM COATED ORAL at 21:46

## 2021-03-25 RX ADMIN — CEFTRIAXONE 100 MILLIGRAM(S): 500 INJECTION, POWDER, FOR SOLUTION INTRAMUSCULAR; INTRAVENOUS at 21:45

## 2021-03-25 RX ADMIN — Medication 25 MILLIGRAM(S): at 05:50

## 2021-03-25 NOTE — PROGRESS NOTE ADULT - PROBLEM SELECTOR PLAN 7
- h/o HTN, on lisinopril 40 qd and metoprolol succ 50 qd   - c/w metoprolol tart 25mg bid for now

## 2021-03-25 NOTE — DISCHARGE NOTE NURSING/CASE MANAGEMENT/SOCIAL WORK - PATIENT PORTAL LINK FT
You can access the FollowMyHealth Patient Portal offered by [facility] by registering at the following website: [email or web address]. By joining [facility]’s FollowMyHealth portal, you will also be able to view your health information using other applications (apps) compatible with our system.

## 2021-03-25 NOTE — PROGRESS NOTE ADULT - PROBLEM SELECTOR PROBLEM 1
Urinary tract infection with hematuria, site unspecified

## 2021-03-25 NOTE — PROGRESS NOTE ADULT - SUBJECTIVE AND OBJECTIVE BOX
PGY-1 Progress Note discussed with attending    PAGER #: [[phone]] TILL 5:00 PM  PLEASE CONTACT ON CALL TEAM:  - On Call Team (Please refer to Sid) FROM 5:00 PM - 8:30PM  - Nightfloat Team FROM 8:30 -7:30 AM        INTERVAL HPI/OVERNIGHT EVENTS:   patient examined bed side, she is comfortable, not in distress, saturating well on RA , spiked fever yesterday, will do CTA and monitor ambulatory oxygen saturation.     REVIEW OF SYSTEMS:  CONSTITUTIONAL: No fever, weight loss, or fatigue  RESPIRATORY: No cough, wheezing, chills or hemoptysis; No shortness of breath  CARDIOVASCULAR: No chest pain, palpitations, dizziness, or leg swelling  GASTROINTESTINAL: No abdominal pain. No nausea, vomiting, or hematemesis; No diarrhea or constipation. No melena or hematochezia.  GENITOURINARY: No dysuria or hematuria, urinary frequency  NEUROLOGICAL: No headaches, memory loss, loss of strength, numbness, or tremors  SKIN: No itching, burning, rashes, or lesions     MEDICATIONS  (STANDING):  aspirin enteric coated 81 milliGRAM(s) Oral daily  atorvastatin 40 milliGRAM(s) Oral at bedtime  cefTRIAXone   IVPB 1000 milliGRAM(s) IV Intermittent every 24 hours  enoxaparin Injectable 40 milliGRAM(s) SubCutaneous two times a day  insulin glargine Injectable (LANTUS) 12 Unit(s) SubCutaneous at bedtime  insulin lispro (ADMELOG) corrective regimen sliding scale   SubCutaneous Before meals and at bedtime  metoprolol tartrate 25 milliGRAM(s) Oral two times a day  pantoprazole    Tablet 40 milliGRAM(s) Oral before breakfast  potassium chloride    Tablet ER 40 milliEquivalent(s) Oral every 4 hours  sodium chloride 0.9%. 1000 milliLiter(s) (100 mL/Hr) IV Continuous <Continuous>    MEDICATIONS  (PRN):  acetaminophen   Tablet .. 650 milliGRAM(s) Oral every 6 hours PRN Temp greater or equal to 38C (100.4F), Mild Pain (1 - 3), Moderate Pain (4 - 6)      Vital Signs Last 24 Hrs  T(C): 37.6 (25 Mar 2021 05:23), Max: 38.3 (24 Mar 2021 21:47)  T(F): 99.6 (25 Mar 2021 05:23), Max: 100.9 (24 Mar 2021 21:47)  HR: 77 (25 Mar 2021 05:23) (74 - 84)  BP: 123/76 (25 Mar 2021 05:23) (123/76 - 146/72)  BP(mean): --  RR: 18 (25 Mar 2021 05:23) (18 - 18)  SpO2: 95% (25 Mar 2021 05:23) (94% - 96%)    PHYSICAL EXAMINATION:  GENERAL: NAD  HEAD:  Atraumatic, Normocephalic  EYES:  conjunctiva and sclera clear  NECK: Supple, No JVD, Normal thyroid  CHEST/LUNG: Clear to auscultation. Clear to percussion bilaterally; No rales, rhonchi, wheezing, or rubs  HEART: Regular rate and rhythm; No murmurs, rubs, or gallops  ABDOMEN: Soft, Nontender, Nondistended; Bowel sounds present  NERVOUS SYSTEM:  Alert & Oriented X3,    EXTREMITIES:  2+ Peripheral Pulses, No clubbing, cyanosis, or edema  SKIN: warm dry                          10.8   4.13  )-----------( 174      ( 25 Mar 2021 06:18 )             31.4     03-25    141  |  104  |  7   ----------------------------<  76  3.0<L>   |  26  |  0.54    Ca    8.3<L>      25 Mar 2021 06:18  Phos  2.8     03-25  Mg     2.1     03-25    TPro  6.0  /  Alb  2.3<L>  /  TBili  1.1  /  DBili  x   /  AST  100<H>  /  ALT  46  /  AlkPhos  368<H>  03-25    LIVER FUNCTIONS - ( 25 Mar 2021 06:18 )  Alb: 2.3 g/dL / Pro: 6.0 g/dL / ALK PHOS: 368 U/L / ALT: 46 U/L DA / AST: 100 U/L / GGT: x                       CAPILLARY BLOOD GLUCOSE  CAPILLARY BLOOD GLUCOSE      POCT Blood Glucose.: 108 mg/dL (25 Mar 2021 11:37)    CAPILLARY BLOOD GLUCOSE      POCT Blood Glucose.: 108 mg/dL (25 Mar 2021 11:37)  POCT Blood Glucose.: 85 mg/dL (25 Mar 2021 07:52)  POCT Blood Glucose.: 117 mg/dL (24 Mar 2021 21:30)  POCT Blood Glucose.: 107 mg/dL (24 Mar 2021 16:54)      RADIOLOGY & ADDITIONAL TESTS:

## 2021-03-25 NOTE — PROGRESS NOTE ADULT - PROBLEM SELECTOR PLAN 8
- h/o HLD, on statin  - c/w statin

## 2021-03-25 NOTE — PROGRESS NOTE ADULT - PROBLEM SELECTOR PLAN 6
- h/o  DM, on metformin 500 qd at home  - a1c 7.3   - c/w lantus 12u qHs and HSS  -pt is to be dced on metformin 500 BID

## 2021-03-25 NOTE — PROGRESS NOTE ADULT - PROBLEM SELECTOR PLAN 1
- presented s/p fall, weakness and malaise   - ED vitals fever 102 , wbc 3.5 low   - suprapubic tenderness  - UA positive  - Ucx & Bcx neg  - c/w ceftriaxone day 5

## 2021-03-25 NOTE — PROGRESS NOTE ADULT - PROBLEM SELECTOR PLAN 3
- resolved  - ED vitals fever 102 , wbc 3.5 low   - U/A pos  - Ucx & Bcx neg  - lactate 2.4 > 1.1  - s/p IVF bolus in ED   - on rocephin  - repeat blood culture neg

## 2021-03-25 NOTE — PROGRESS NOTE ADULT - PROBLEM SELECTOR PLAN 5
- p/w multiple falls   - CTH no intracranial hemorrhage, mass effect or depressed skull fracture.  - CT cervical spine no displaced fracture or traumatic malalignment.  - CT lumbar spine no acute displaced fracture.  - PT home PT with walker

## 2021-03-25 NOTE — PROGRESS NOTE ADULT - ATTENDING COMMENTS
Patient seen and examined this morning, plan of care discussed w/ medical team. Patient admitted s/p fall, found to have a UTI and + for COVID 19. Completed CTX x 5 days for UTI, was spiking fevers, secondary to COVID infection, remains stable on room air, CT-A chest negative for PE has b/l GGO consistent w/ covid, no respiratory symptoms therefore hold on Rem/Dex, Repeat Blood cultures negative. PT recommending home PT w/ RW, stable for discharge home today, c/w home ASA for extended DVT ppx and increase Metformin to 500mg BID for further optimization of DM.    Rest as per resident's note.
Patient seen and examined this morning, plan of care discussed w/ medical team. Patient admitted s/p fall, found to have a UTI and + for COVID 19. Continue CTX x 5 days for UTI, was spiking fevers, suspect secondary to COVID infection, remains stable on room air and no respiratory symptoms therefore hold on Rem/Dex, will continue to monitor O2 and COVID markers. Follow up repeat Blood cultures to ensure no bacteremia. PT recommending home PT w/ RW, if she remains afebrile and repeat cultures remain negative can discharge home likely tomorrow, will send w/ ASA x 30 for extended DVT ppx and increase Metformin to 500mg BID for further optimization of DM.    Rest as per resident's note.
Patient was seen and examined at bedside   Reports feeling better, denies any complains  Knee and ankle pain resolved     Spiked fever last night    P/E: as above    Labs noted    A/P:  UTI  COVID 19 infection not requiring oxygen  NSTEMI  DM  HTN  HLD  Multiple falls   Adnexal cyst 1.4cm right sided  Electrolyte imbalance     Plan:  Cont ceftriaxone, neg urine culture   Trended trop to peak   Supplement electrolytes   Cont home medications for HTN and HLD  ISS   Not requiring oxygen, does not need Remdesevir or dexamethasone   Monitor respiratory status closely  Monitor inflammatory markers   Pelvic US  Cont IVF  PT eval home with home PT  Full code
Patient seen and examined this morning, plan of care discussed w/ medical team. Patient admitted s/p fall, found to have a UTI and + for COVID 19. Continue CTX for UTI, continues to spike fevers, suspect secondary to COVID infection, remains stable on room air and no respiratory symptoms therefore hold on Rem/Dex, will continue to monitor O2 and COVID markers. Repeat Blood cultures to ensure no bacteremia. PT recommending home PT w/ RW, if fevers resolved repeat cultures remain negative can discharge home likely in 1-2 days. Will discharge on ASA x 30 for extended DVT ppx and increase Metformin to 500mg BID for further optimization of DM.    Rest as per resident's note.

## 2021-03-26 VITALS
RESPIRATION RATE: 18 BRPM | SYSTOLIC BLOOD PRESSURE: 133 MMHG | DIASTOLIC BLOOD PRESSURE: 77 MMHG | TEMPERATURE: 99 F | OXYGEN SATURATION: 100 % | HEART RATE: 73 BPM

## 2021-03-26 LAB
ALBUMIN SERPL ELPH-MCNC: 2.9 G/DL — LOW (ref 3.5–5)
ALP SERPL-CCNC: 501 U/L — HIGH (ref 40–120)
ALT FLD-CCNC: 54 U/L DA — SIGNIFICANT CHANGE UP (ref 10–60)
ANION GAP SERPL CALC-SCNC: 11 MMOL/L — SIGNIFICANT CHANGE UP (ref 5–17)
AST SERPL-CCNC: 98 U/L — HIGH (ref 10–40)
BASOPHILS # BLD AUTO: 0 K/UL — SIGNIFICANT CHANGE UP (ref 0–0.2)
BASOPHILS NFR BLD AUTO: 0 % — SIGNIFICANT CHANGE UP (ref 0–2)
BILIRUB SERPL-MCNC: 1.2 MG/DL — SIGNIFICANT CHANGE UP (ref 0.2–1.2)
BUN SERPL-MCNC: 7 MG/DL — SIGNIFICANT CHANGE UP (ref 7–18)
CALCIUM SERPL-MCNC: 8.2 MG/DL — LOW (ref 8.4–10.5)
CHLORIDE SERPL-SCNC: 102 MMOL/L — SIGNIFICANT CHANGE UP (ref 96–108)
CO2 SERPL-SCNC: 23 MMOL/L — SIGNIFICANT CHANGE UP (ref 22–31)
CREAT SERPL-MCNC: 0.61 MG/DL — SIGNIFICANT CHANGE UP (ref 0.5–1.3)
CULTURE RESULTS: SIGNIFICANT CHANGE UP
CULTURE RESULTS: SIGNIFICANT CHANGE UP
DACRYOCYTES BLD QL SMEAR: SLIGHT — SIGNIFICANT CHANGE UP
EOSINOPHIL # BLD AUTO: 0.08 K/UL — SIGNIFICANT CHANGE UP (ref 0–0.5)
EOSINOPHIL NFR BLD AUTO: 1 % — SIGNIFICANT CHANGE UP (ref 0–6)
GLUCOSE BLDC GLUCOMTR-MCNC: 81 MG/DL — SIGNIFICANT CHANGE UP (ref 70–99)
GLUCOSE BLDC GLUCOMTR-MCNC: 90 MG/DL — SIGNIFICANT CHANGE UP (ref 70–99)
GLUCOSE SERPL-MCNC: 80 MG/DL — SIGNIFICANT CHANGE UP (ref 70–99)
HCT VFR BLD CALC: 33.4 % — LOW (ref 34.5–45)
HGB BLD-MCNC: 11.4 G/DL — LOW (ref 11.5–15.5)
LG PLATELETS BLD QL AUTO: SLIGHT — SIGNIFICANT CHANGE UP
LYMPHOCYTES # BLD AUTO: 1.73 K/UL — SIGNIFICANT CHANGE UP (ref 1–3.3)
LYMPHOCYTES # BLD AUTO: 23 % — SIGNIFICANT CHANGE UP (ref 13–44)
MAGNESIUM SERPL-MCNC: 2.2 MG/DL — SIGNIFICANT CHANGE UP (ref 1.6–2.6)
MANUAL SMEAR VERIFICATION: SIGNIFICANT CHANGE UP
MCHC RBC-ENTMCNC: 29.8 PG — SIGNIFICANT CHANGE UP (ref 27–34)
MCHC RBC-ENTMCNC: 34.1 GM/DL — SIGNIFICANT CHANGE UP (ref 32–36)
MCV RBC AUTO: 87.4 FL — SIGNIFICANT CHANGE UP (ref 80–100)
MONOCYTES # BLD AUTO: 0.75 K/UL — SIGNIFICANT CHANGE UP (ref 0–0.9)
MONOCYTES NFR BLD AUTO: 10 % — SIGNIFICANT CHANGE UP (ref 2–14)
NEUTROPHILS # BLD AUTO: 4.95 K/UL — SIGNIFICANT CHANGE UP (ref 1.8–7.4)
NEUTROPHILS NFR BLD AUTO: 66 % — SIGNIFICANT CHANGE UP (ref 43–77)
NRBC # BLD: 0 /100 — SIGNIFICANT CHANGE UP (ref 0–0)
OVALOCYTES BLD QL SMEAR: SLIGHT — SIGNIFICANT CHANGE UP
PHOSPHATE SERPL-MCNC: 2.7 MG/DL — SIGNIFICANT CHANGE UP (ref 2.5–4.5)
PLAT MORPH BLD: NORMAL — SIGNIFICANT CHANGE UP
PLATELET # BLD AUTO: 334 K/UL — SIGNIFICANT CHANGE UP (ref 150–400)
PLATELET COUNT - ESTIMATE: NORMAL — SIGNIFICANT CHANGE UP
POIKILOCYTOSIS BLD QL AUTO: SLIGHT — SIGNIFICANT CHANGE UP
POTASSIUM SERPL-MCNC: 3.3 MMOL/L — LOW (ref 3.5–5.3)
POTASSIUM SERPL-SCNC: 3.3 MMOL/L — LOW (ref 3.5–5.3)
PROT SERPL-MCNC: 7.1 G/DL — SIGNIFICANT CHANGE UP (ref 6–8.3)
RBC # BLD: 3.82 M/UL — SIGNIFICANT CHANGE UP (ref 3.8–5.2)
RBC # FLD: 12.7 % — SIGNIFICANT CHANGE UP (ref 10.3–14.5)
RBC BLD AUTO: ABNORMAL
SODIUM SERPL-SCNC: 136 MMOL/L — SIGNIFICANT CHANGE UP (ref 135–145)
SPECIMEN SOURCE: SIGNIFICANT CHANGE UP
SPECIMEN SOURCE: SIGNIFICANT CHANGE UP
WBC # BLD: 7.5 K/UL — SIGNIFICANT CHANGE UP (ref 3.8–10.5)
WBC # FLD AUTO: 7.5 K/UL — SIGNIFICANT CHANGE UP (ref 3.8–10.5)

## 2021-03-26 PROCEDURE — 84100 ASSAY OF PHOSPHORUS: CPT

## 2021-03-26 PROCEDURE — 73562 X-RAY EXAM OF KNEE 3: CPT

## 2021-03-26 PROCEDURE — 84484 ASSAY OF TROPONIN QUANT: CPT

## 2021-03-26 PROCEDURE — 99285 EMERGENCY DEPT VISIT HI MDM: CPT | Mod: 25

## 2021-03-26 PROCEDURE — 85610 PROTHROMBIN TIME: CPT

## 2021-03-26 PROCEDURE — 80053 COMPREHEN METABOLIC PANEL: CPT

## 2021-03-26 PROCEDURE — 85025 COMPLETE CBC W/AUTO DIFF WBC: CPT

## 2021-03-26 PROCEDURE — 93005 ELECTROCARDIOGRAM TRACING: CPT

## 2021-03-26 PROCEDURE — 71045 X-RAY EXAM CHEST 1 VIEW: CPT

## 2021-03-26 PROCEDURE — 82746 ASSAY OF FOLIC ACID SERUM: CPT

## 2021-03-26 PROCEDURE — 82962 GLUCOSE BLOOD TEST: CPT

## 2021-03-26 PROCEDURE — 80048 BASIC METABOLIC PNL TOTAL CA: CPT

## 2021-03-26 PROCEDURE — 82550 ASSAY OF CK (CPK): CPT

## 2021-03-26 PROCEDURE — 83036 HEMOGLOBIN GLYCOSYLATED A1C: CPT

## 2021-03-26 PROCEDURE — 87086 URINE CULTURE/COLONY COUNT: CPT

## 2021-03-26 PROCEDURE — 86140 C-REACTIVE PROTEIN: CPT

## 2021-03-26 PROCEDURE — 83735 ASSAY OF MAGNESIUM: CPT

## 2021-03-26 PROCEDURE — 96374 THER/PROPH/DIAG INJ IV PUSH: CPT

## 2021-03-26 PROCEDURE — 87040 BLOOD CULTURE FOR BACTERIA: CPT

## 2021-03-26 PROCEDURE — 71275 CT ANGIOGRAPHY CHEST: CPT

## 2021-03-26 PROCEDURE — 81001 URINALYSIS AUTO W/SCOPE: CPT

## 2021-03-26 PROCEDURE — 85379 FIBRIN DEGRADATION QUANT: CPT

## 2021-03-26 PROCEDURE — 85652 RBC SED RATE AUTOMATED: CPT

## 2021-03-26 PROCEDURE — 0225U NFCT DS DNA&RNA 21 SARSCOV2: CPT

## 2021-03-26 PROCEDURE — 72131 CT LUMBAR SPINE W/O DYE: CPT

## 2021-03-26 PROCEDURE — 85045 AUTOMATED RETICULOCYTE COUNT: CPT

## 2021-03-26 PROCEDURE — 83540 ASSAY OF IRON: CPT

## 2021-03-26 PROCEDURE — 87635 SARS-COV-2 COVID-19 AMP PRB: CPT

## 2021-03-26 PROCEDURE — 97162 PT EVAL MOD COMPLEX 30 MIN: CPT

## 2021-03-26 PROCEDURE — 73610 X-RAY EXAM OF ANKLE: CPT

## 2021-03-26 PROCEDURE — 85730 THROMBOPLASTIN TIME PARTIAL: CPT

## 2021-03-26 PROCEDURE — 83615 LACTATE (LD) (LDH) ENZYME: CPT

## 2021-03-26 PROCEDURE — 85027 COMPLETE CBC AUTOMATED: CPT

## 2021-03-26 PROCEDURE — 83605 ASSAY OF LACTIC ACID: CPT

## 2021-03-26 PROCEDURE — 70450 CT HEAD/BRAIN W/O DYE: CPT

## 2021-03-26 PROCEDURE — 83550 IRON BINDING TEST: CPT

## 2021-03-26 PROCEDURE — 82728 ASSAY OF FERRITIN: CPT

## 2021-03-26 PROCEDURE — 72192 CT PELVIS W/O DYE: CPT

## 2021-03-26 PROCEDURE — 80061 LIPID PANEL: CPT

## 2021-03-26 PROCEDURE — 72125 CT NECK SPINE W/O DYE: CPT

## 2021-03-26 PROCEDURE — 82607 VITAMIN B-12: CPT

## 2021-03-26 PROCEDURE — 86769 SARS-COV-2 COVID-19 ANTIBODY: CPT

## 2021-03-26 PROCEDURE — 99239 HOSP IP/OBS DSCHRG MGMT >30: CPT | Mod: GC

## 2021-03-26 PROCEDURE — 84443 ASSAY THYROID STIM HORMONE: CPT

## 2021-03-26 PROCEDURE — 36415 COLL VENOUS BLD VENIPUNCTURE: CPT

## 2021-03-26 RX ADMIN — Medication 81 MILLIGRAM(S): at 11:45

## 2021-03-26 RX ADMIN — Medication 25 MILLIGRAM(S): at 05:22

## 2021-03-26 RX ADMIN — PANTOPRAZOLE SODIUM 40 MILLIGRAM(S): 20 TABLET, DELAYED RELEASE ORAL at 05:22

## 2021-03-26 RX ADMIN — ENOXAPARIN SODIUM 40 MILLIGRAM(S): 100 INJECTION SUBCUTANEOUS at 05:22

## 2021-03-28 LAB
CULTURE RESULTS: SIGNIFICANT CHANGE UP
CULTURE RESULTS: SIGNIFICANT CHANGE UP
SPECIMEN SOURCE: SIGNIFICANT CHANGE UP
SPECIMEN SOURCE: SIGNIFICANT CHANGE UP

## 2023-05-15 NOTE — ED PROVIDER NOTE - SECONDARY DIAGNOSIS.
PDMP Monitoring:    Last PDMP Margi Dunn as Reviewed [facility]):  Review User Review Instant Review Result   BRITTANY Garcia 4/11/2023 11:10 AM Reviewed PDMP [1]     Urine Drug Screenings (1 yr)    No resulted procedures found.        Medication Contract and Consent for Opioid Use Documents Filed      No documents found Urinary tract infection with hematuria, site unspecified

## 2023-10-17 ENCOUNTER — EMERGENCY (EMERGENCY)
Facility: HOSPITAL | Age: 83
LOS: 1 days | Discharge: HOME | End: 2023-10-17
Attending: STUDENT IN AN ORGANIZED HEALTH CARE EDUCATION/TRAINING PROGRAM
Payer: COMMERCIAL

## 2023-10-17 VITALS
RESPIRATION RATE: 18 BRPM | HEIGHT: 62 IN | OXYGEN SATURATION: 99 % | TEMPERATURE: 99 F | WEIGHT: 156.09 LBS | DIASTOLIC BLOOD PRESSURE: 76 MMHG | SYSTOLIC BLOOD PRESSURE: 121 MMHG | HEART RATE: 70 BPM

## 2023-10-17 VITALS
DIASTOLIC BLOOD PRESSURE: 76 MMHG | RESPIRATION RATE: 20 BRPM | TEMPERATURE: 98 F | SYSTOLIC BLOOD PRESSURE: 127 MMHG | HEART RATE: 63 BPM | OXYGEN SATURATION: 99 %

## 2023-10-17 LAB
ALBUMIN SERPL ELPH-MCNC: 3.9 G/DL — SIGNIFICANT CHANGE UP (ref 3.5–5)
ALBUMIN SERPL ELPH-MCNC: 3.9 G/DL — SIGNIFICANT CHANGE UP (ref 3.5–5)
ALP SERPL-CCNC: 101 U/L — SIGNIFICANT CHANGE UP (ref 40–120)
ALP SERPL-CCNC: 101 U/L — SIGNIFICANT CHANGE UP (ref 40–120)
ALT FLD-CCNC: 20 U/L DA — SIGNIFICANT CHANGE UP (ref 10–60)
ALT FLD-CCNC: 20 U/L DA — SIGNIFICANT CHANGE UP (ref 10–60)
ANION GAP SERPL CALC-SCNC: 2 MMOL/L — LOW (ref 5–17)
ANION GAP SERPL CALC-SCNC: 2 MMOL/L — LOW (ref 5–17)
APPEARANCE UR: CLEAR — SIGNIFICANT CHANGE UP
APPEARANCE UR: CLEAR — SIGNIFICANT CHANGE UP
AST SERPL-CCNC: 16 U/L — SIGNIFICANT CHANGE UP (ref 10–40)
AST SERPL-CCNC: 16 U/L — SIGNIFICANT CHANGE UP (ref 10–40)
BACTERIA # UR AUTO: ABNORMAL /HPF
BACTERIA # UR AUTO: ABNORMAL /HPF
BASOPHILS # BLD AUTO: 0.05 K/UL — SIGNIFICANT CHANGE UP (ref 0–0.2)
BASOPHILS # BLD AUTO: 0.05 K/UL — SIGNIFICANT CHANGE UP (ref 0–0.2)
BASOPHILS NFR BLD AUTO: 0.5 % — SIGNIFICANT CHANGE UP (ref 0–2)
BASOPHILS NFR BLD AUTO: 0.5 % — SIGNIFICANT CHANGE UP (ref 0–2)
BILIRUB SERPL-MCNC: 1.3 MG/DL — HIGH (ref 0.2–1.2)
BILIRUB SERPL-MCNC: 1.3 MG/DL — HIGH (ref 0.2–1.2)
BILIRUB UR-MCNC: NEGATIVE — SIGNIFICANT CHANGE UP
BILIRUB UR-MCNC: NEGATIVE — SIGNIFICANT CHANGE UP
BUN SERPL-MCNC: 14 MG/DL — SIGNIFICANT CHANGE UP (ref 7–18)
BUN SERPL-MCNC: 14 MG/DL — SIGNIFICANT CHANGE UP (ref 7–18)
CALCIUM SERPL-MCNC: 9.4 MG/DL — SIGNIFICANT CHANGE UP (ref 8.4–10.5)
CALCIUM SERPL-MCNC: 9.4 MG/DL — SIGNIFICANT CHANGE UP (ref 8.4–10.5)
CHLORIDE SERPL-SCNC: 109 MMOL/L — HIGH (ref 96–108)
CHLORIDE SERPL-SCNC: 109 MMOL/L — HIGH (ref 96–108)
CO2 SERPL-SCNC: 31 MMOL/L — SIGNIFICANT CHANGE UP (ref 22–31)
CO2 SERPL-SCNC: 31 MMOL/L — SIGNIFICANT CHANGE UP (ref 22–31)
COLOR SPEC: YELLOW — SIGNIFICANT CHANGE UP
COLOR SPEC: YELLOW — SIGNIFICANT CHANGE UP
CREAT SERPL-MCNC: 0.82 MG/DL — SIGNIFICANT CHANGE UP (ref 0.5–1.3)
CREAT SERPL-MCNC: 0.82 MG/DL — SIGNIFICANT CHANGE UP (ref 0.5–1.3)
DIFF PNL FLD: NEGATIVE — SIGNIFICANT CHANGE UP
DIFF PNL FLD: NEGATIVE — SIGNIFICANT CHANGE UP
EGFR: 72 ML/MIN/1.73M2 — SIGNIFICANT CHANGE UP
EGFR: 72 ML/MIN/1.73M2 — SIGNIFICANT CHANGE UP
EOSINOPHIL # BLD AUTO: 0.07 K/UL — SIGNIFICANT CHANGE UP (ref 0–0.5)
EOSINOPHIL # BLD AUTO: 0.07 K/UL — SIGNIFICANT CHANGE UP (ref 0–0.5)
EOSINOPHIL NFR BLD AUTO: 0.7 % — SIGNIFICANT CHANGE UP (ref 0–6)
EOSINOPHIL NFR BLD AUTO: 0.7 % — SIGNIFICANT CHANGE UP (ref 0–6)
EPI CELLS # UR: PRESENT
EPI CELLS # UR: PRESENT
GLUCOSE SERPL-MCNC: 172 MG/DL — HIGH (ref 70–99)
GLUCOSE SERPL-MCNC: 172 MG/DL — HIGH (ref 70–99)
GLUCOSE UR QL: NEGATIVE MG/DL — SIGNIFICANT CHANGE UP
GLUCOSE UR QL: NEGATIVE MG/DL — SIGNIFICANT CHANGE UP
HCT VFR BLD CALC: 37.3 % — SIGNIFICANT CHANGE UP (ref 34.5–45)
HCT VFR BLD CALC: 37.3 % — SIGNIFICANT CHANGE UP (ref 34.5–45)
HGB BLD-MCNC: 12.2 G/DL — SIGNIFICANT CHANGE UP (ref 11.5–15.5)
HGB BLD-MCNC: 12.2 G/DL — SIGNIFICANT CHANGE UP (ref 11.5–15.5)
IMM GRANULOCYTES NFR BLD AUTO: 0.5 % — SIGNIFICANT CHANGE UP (ref 0–0.9)
IMM GRANULOCYTES NFR BLD AUTO: 0.5 % — SIGNIFICANT CHANGE UP (ref 0–0.9)
KETONES UR-MCNC: NEGATIVE MG/DL — SIGNIFICANT CHANGE UP
KETONES UR-MCNC: NEGATIVE MG/DL — SIGNIFICANT CHANGE UP
LACTATE SERPL-SCNC: 2.6 MMOL/L — HIGH (ref 0.7–2)
LACTATE SERPL-SCNC: 2.6 MMOL/L — HIGH (ref 0.7–2)
LEUKOCYTE ESTERASE UR-ACNC: ABNORMAL
LEUKOCYTE ESTERASE UR-ACNC: ABNORMAL
LIDOCAIN IGE QN: 41 U/L — SIGNIFICANT CHANGE UP (ref 13–75)
LIDOCAIN IGE QN: 41 U/L — SIGNIFICANT CHANGE UP (ref 13–75)
LYMPHOCYTES # BLD AUTO: 29.4 % — SIGNIFICANT CHANGE UP (ref 13–44)
LYMPHOCYTES # BLD AUTO: 29.4 % — SIGNIFICANT CHANGE UP (ref 13–44)
LYMPHOCYTES # BLD AUTO: 3 K/UL — SIGNIFICANT CHANGE UP (ref 1–3.3)
LYMPHOCYTES # BLD AUTO: 3 K/UL — SIGNIFICANT CHANGE UP (ref 1–3.3)
MCHC RBC-ENTMCNC: 29.8 PG — SIGNIFICANT CHANGE UP (ref 27–34)
MCHC RBC-ENTMCNC: 29.8 PG — SIGNIFICANT CHANGE UP (ref 27–34)
MCHC RBC-ENTMCNC: 32.7 GM/DL — SIGNIFICANT CHANGE UP (ref 32–36)
MCHC RBC-ENTMCNC: 32.7 GM/DL — SIGNIFICANT CHANGE UP (ref 32–36)
MCV RBC AUTO: 91.2 FL — SIGNIFICANT CHANGE UP (ref 80–100)
MCV RBC AUTO: 91.2 FL — SIGNIFICANT CHANGE UP (ref 80–100)
MONOCYTES # BLD AUTO: 0.66 K/UL — SIGNIFICANT CHANGE UP (ref 0–0.9)
MONOCYTES # BLD AUTO: 0.66 K/UL — SIGNIFICANT CHANGE UP (ref 0–0.9)
MONOCYTES NFR BLD AUTO: 6.5 % — SIGNIFICANT CHANGE UP (ref 2–14)
MONOCYTES NFR BLD AUTO: 6.5 % — SIGNIFICANT CHANGE UP (ref 2–14)
NEUTROPHILS # BLD AUTO: 6.39 K/UL — SIGNIFICANT CHANGE UP (ref 1.8–7.4)
NEUTROPHILS # BLD AUTO: 6.39 K/UL — SIGNIFICANT CHANGE UP (ref 1.8–7.4)
NEUTROPHILS NFR BLD AUTO: 62.4 % — SIGNIFICANT CHANGE UP (ref 43–77)
NEUTROPHILS NFR BLD AUTO: 62.4 % — SIGNIFICANT CHANGE UP (ref 43–77)
NITRITE UR-MCNC: NEGATIVE — SIGNIFICANT CHANGE UP
NITRITE UR-MCNC: NEGATIVE — SIGNIFICANT CHANGE UP
NRBC # BLD: 0 /100 WBCS — SIGNIFICANT CHANGE UP (ref 0–0)
NRBC # BLD: 0 /100 WBCS — SIGNIFICANT CHANGE UP (ref 0–0)
PH UR: 7.5 — SIGNIFICANT CHANGE UP (ref 5–8)
PH UR: 7.5 — SIGNIFICANT CHANGE UP (ref 5–8)
PLATELET # BLD AUTO: 269 K/UL — SIGNIFICANT CHANGE UP (ref 150–400)
PLATELET # BLD AUTO: 269 K/UL — SIGNIFICANT CHANGE UP (ref 150–400)
POTASSIUM SERPL-MCNC: 4.4 MMOL/L — SIGNIFICANT CHANGE UP (ref 3.5–5.3)
POTASSIUM SERPL-MCNC: 4.4 MMOL/L — SIGNIFICANT CHANGE UP (ref 3.5–5.3)
POTASSIUM SERPL-SCNC: 4.4 MMOL/L — SIGNIFICANT CHANGE UP (ref 3.5–5.3)
POTASSIUM SERPL-SCNC: 4.4 MMOL/L — SIGNIFICANT CHANGE UP (ref 3.5–5.3)
PROT SERPL-MCNC: 7.4 G/DL — SIGNIFICANT CHANGE UP (ref 6–8.3)
PROT SERPL-MCNC: 7.4 G/DL — SIGNIFICANT CHANGE UP (ref 6–8.3)
PROT UR-MCNC: NEGATIVE MG/DL — SIGNIFICANT CHANGE UP
PROT UR-MCNC: NEGATIVE MG/DL — SIGNIFICANT CHANGE UP
RBC # BLD: 4.09 M/UL — SIGNIFICANT CHANGE UP (ref 3.8–5.2)
RBC # BLD: 4.09 M/UL — SIGNIFICANT CHANGE UP (ref 3.8–5.2)
RBC # FLD: 12.7 % — SIGNIFICANT CHANGE UP (ref 10.3–14.5)
RBC # FLD: 12.7 % — SIGNIFICANT CHANGE UP (ref 10.3–14.5)
RBC CASTS # UR COMP ASSIST: 0 /HPF — SIGNIFICANT CHANGE UP (ref 0–4)
RBC CASTS # UR COMP ASSIST: 0 /HPF — SIGNIFICANT CHANGE UP (ref 0–4)
SODIUM SERPL-SCNC: 142 MMOL/L — SIGNIFICANT CHANGE UP (ref 135–145)
SODIUM SERPL-SCNC: 142 MMOL/L — SIGNIFICANT CHANGE UP (ref 135–145)
SP GR SPEC: 1.01 — SIGNIFICANT CHANGE UP (ref 1–1.03)
SP GR SPEC: 1.01 — SIGNIFICANT CHANGE UP (ref 1–1.03)
TROPONIN I, HIGH SENSITIVITY RESULT: 15.6 NG/L — SIGNIFICANT CHANGE UP
TROPONIN I, HIGH SENSITIVITY RESULT: 15.6 NG/L — SIGNIFICANT CHANGE UP
UROBILINOGEN FLD QL: 1 MG/DL — SIGNIFICANT CHANGE UP (ref 0.2–1)
UROBILINOGEN FLD QL: 1 MG/DL — SIGNIFICANT CHANGE UP (ref 0.2–1)
WBC # BLD: 10.22 K/UL — SIGNIFICANT CHANGE UP (ref 3.8–10.5)
WBC # BLD: 10.22 K/UL — SIGNIFICANT CHANGE UP (ref 3.8–10.5)
WBC # FLD AUTO: 10.22 K/UL — SIGNIFICANT CHANGE UP (ref 3.8–10.5)
WBC # FLD AUTO: 10.22 K/UL — SIGNIFICANT CHANGE UP (ref 3.8–10.5)
WBC UR QL: 4 /HPF — SIGNIFICANT CHANGE UP (ref 0–5)
WBC UR QL: 4 /HPF — SIGNIFICANT CHANGE UP (ref 0–5)

## 2023-10-17 PROCEDURE — 93005 ELECTROCARDIOGRAM TRACING: CPT

## 2023-10-17 PROCEDURE — 83690 ASSAY OF LIPASE: CPT

## 2023-10-17 PROCEDURE — 99285 EMERGENCY DEPT VISIT HI MDM: CPT

## 2023-10-17 PROCEDURE — 99285 EMERGENCY DEPT VISIT HI MDM: CPT | Mod: 25

## 2023-10-17 PROCEDURE — 74177 CT ABD & PELVIS W/CONTRAST: CPT | Mod: MA

## 2023-10-17 PROCEDURE — 83605 ASSAY OF LACTIC ACID: CPT

## 2023-10-17 PROCEDURE — 36415 COLL VENOUS BLD VENIPUNCTURE: CPT

## 2023-10-17 PROCEDURE — 85025 COMPLETE CBC W/AUTO DIFF WBC: CPT

## 2023-10-17 PROCEDURE — 80053 COMPREHEN METABOLIC PANEL: CPT

## 2023-10-17 PROCEDURE — 87086 URINE CULTURE/COLONY COUNT: CPT

## 2023-10-17 PROCEDURE — 84484 ASSAY OF TROPONIN QUANT: CPT

## 2023-10-17 PROCEDURE — 81001 URINALYSIS AUTO W/SCOPE: CPT

## 2023-10-17 PROCEDURE — 74177 CT ABD & PELVIS W/CONTRAST: CPT | Mod: 26,MA

## 2023-10-17 RX ADMIN — Medication 1 TABLET(S): at 14:34

## 2023-10-17 NOTE — ED PROVIDER NOTE - NSFOLLOWUPINSTRUCTIONS_ED_ALL_ED_FT
Diverticulitis    Diverticulitis is when small pouches in your colon (large intestine) get infected or swollen. This causes pain in the belly (abdomen) and watery poop (diarrhea).    These pouches are called diverticula. The pouches form in people who have a condition called diverticulosis.    What are the causes?  This condition may be caused by poop (stool) that gets trapped in the pouches in your colon. The poop lets germs (bacteria) grow in the pouches. This causes the infection.    What increases the risk?  You are more likely to get this condition if you have small pouches in your colon. The risk is higher if:  You are overweight or very overweight (obese).  You do not exercise enough.  You drink alcohol.  You smoke or use products with tobacco in them.  You eat a diet that has a lot of red meat such as beef, pork, or lamb.  You eat a diet that does not have enough fiber in it.  You are older than 40 years of age.  What are the signs or symptoms?  Pain in the belly. Pain is often on the left side, but it may be in other areas.  Fever and feeling cold.  Feeling like you may vomit.  Vomiting.  Having cramps.  Feeling full.  Changes to how often you poop.  Blood in your poop.  How is this treated?  Most cases are treated at home by:  Taking over-the-counter pain medicines.  Following a clear liquid diet.  Taking antibiotic medicines.  Resting.  Very bad cases may need to be treated at a hospital. This may include:  Not eating or drinking.  Taking prescription pain medicine.  Getting antibiotic medicines through an IV tube.  Getting fluid and food through an IV tube.  Having surgery.  When you are feeling better, your doctor may tell you to have a test to check your colon (colonoscopy).    Follow these instructions at home:  Medicines    Take over-the-counter and prescription medicines only as told by your doctor. These include:  Antibiotics.  Pain medicines.  Fiber pills.  Probiotics.  Stool softeners.  If you were prescribed an antibiotic medicine, take it as told by your doctor. Do not stop taking the antibiotic even if you start to feel better.  Ask your doctor if the medicine prescribed to you requires you to avoid driving or using machinery.  Eating and drinking      Follow a diet as told by your doctor.  When you feel better, your doctor may tell you to change your diet. You may need to eat a lot of fiber. Fiber makes it easier to poop (have a bowel movement). Foods with fiber include:  Berries.  Beans.  Lentils.  Green vegetables.  Avoid eating red meat.  General instructions    Do not use any products that contain nicotine or tobacco, such as cigarettes, e-cigarettes, and chewing tobacco. If you need help quitting, ask your doctor.  Exercise 3 or more times a week. Try to get 30 minutes each time. Exercise enough to sweat and make your heart beat faster.  Keep all follow-up visits as told by your doctor. This is important.  Contact a doctor if:  Your pain does not get better.  You are not pooping like normal.  Get help right away if:  Your pain gets worse.  Your symptoms do not get better.  Your symptoms get worse very fast.  You have a fever.  You vomit more than one time.  You have poop that is:  Bloody.  Black.  Tarry.  Summary  This condition happens when small pouches in your colon get infected or swollen.  Take medicines only as told by your doctor.  Follow a diet as told by your doctor.  Keep all follow-up visits as told by your doctor. This is important.

## 2023-10-17 NOTE — ED PROVIDER NOTE - PATIENT PORTAL LINK FT
You can access the FollowMyHealth Patient Portal offered by [facility] by registering at the following website: [email or web address]. By joining [facility]’s FollowMyHealth portal, you will also be able to view your health information using other applications (apps) compatible with our system.

## 2023-10-17 NOTE — ED PROVIDER NOTE - IV ALTEPLASE ADMIN OUTSIDE HIDDEN
Med Rec completed per patient   Allergies reviewed  No ORAL antibiotics in last 30 days    Patient states that she is not taking any prescription medications    show

## 2023-10-17 NOTE — ED PROVIDER NOTE - CLINICAL SUMMARY MEDICAL DECISION MAKING FREE TEXT BOX
83-year-old female with past medical history of hypertension, hyperlipidemia, diabetes coming in with 1 day of left-sided abdominal pain.  No nausea, vomiting, diarrhea, blood in the stools, dark stools, chest pain, shortness of breath, fevers, chills.  Patient reports she had dysuria couple of weeks ago that is now resolved.  No history of abdominal surgeries in the past.  Did not take anything for pain and does not want to take anything for pain.  Patient is well-appearing.  No distress.  Abdomen soft with tenderness to palpation and left lower quadrant more than left upper quadrant.  No CVA tenderness to palpation.  Differential diagnoses include but not limited to diverticulitis, cystitis, other intra-abdominal pathologies.
